# Patient Record
Sex: MALE | Race: WHITE | NOT HISPANIC OR LATINO | Employment: OTHER | URBAN - METROPOLITAN AREA
[De-identification: names, ages, dates, MRNs, and addresses within clinical notes are randomized per-mention and may not be internally consistent; named-entity substitution may affect disease eponyms.]

---

## 2023-02-15 PROBLEM — E78.5 HYPERLIPIDEMIA: Status: ACTIVE | Noted: 2023-02-15

## 2023-02-15 PROBLEM — G62.9 NEUROPATHY: Status: ACTIVE | Noted: 2023-02-15

## 2023-02-15 PROBLEM — M54.16 LUMBAR RADICULITIS: Status: ACTIVE | Noted: 2023-02-15

## 2023-02-15 PROBLEM — R09.89 CHEST CONGESTION: Status: ACTIVE | Noted: 2023-02-15

## 2023-02-15 PROBLEM — M47.816 LUMBAR SPONDYLOSIS: Status: ACTIVE | Noted: 2023-02-15

## 2023-02-15 PROBLEM — N41.9 PROSTATITIS: Status: ACTIVE | Noted: 2023-02-15

## 2023-02-15 PROBLEM — H53.40 VISUAL FIELD DEFECT: Status: ACTIVE | Noted: 2023-02-15

## 2023-02-15 PROBLEM — G56.01 CARPAL TUNNEL SYNDROME OF RIGHT WRIST: Status: ACTIVE | Noted: 2023-02-15

## 2023-02-15 PROBLEM — R35.81 NOCTURNAL POLYURIA: Status: ACTIVE | Noted: 2023-02-15

## 2023-02-15 PROBLEM — E11.9 DIABETES MELLITUS, TYPE 2 (MULTI): Status: ACTIVE | Noted: 2023-02-15

## 2023-02-15 PROBLEM — M77.8 TENDINITIS OF LEFT ELBOW: Status: ACTIVE | Noted: 2023-02-15

## 2023-02-15 PROBLEM — R20.2: Status: ACTIVE | Noted: 2023-02-15

## 2023-02-15 PROBLEM — H53.9 VISUAL DISTURBANCE: Status: ACTIVE | Noted: 2023-02-15

## 2023-02-15 PROBLEM — M54.50 CHRONIC LOW BACK PAIN: Status: ACTIVE | Noted: 2023-02-15

## 2023-02-15 PROBLEM — H54.61 VISION LOSS OF RIGHT EYE: Status: ACTIVE | Noted: 2023-02-15

## 2023-02-15 PROBLEM — R41.3 MEMORY IMPAIRMENT: Status: ACTIVE | Noted: 2023-02-15

## 2023-02-15 PROBLEM — E78.5 DYSLIPIDEMIA: Status: ACTIVE | Noted: 2023-02-15
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
## 2023-02-15 PROBLEM — I10 HYPERTENSION: Status: ACTIVE | Noted: 2023-02-15

## 2023-02-15 PROBLEM — M77.8 THUMB TENDONITIS: Status: ACTIVE | Noted: 2023-02-15

## 2023-02-15 PROBLEM — H57.11 OCULAR PAIN, RIGHT EYE: Status: ACTIVE | Noted: 2023-02-15

## 2023-02-15 PROBLEM — S39.012A STRAIN, LUMBOSACRAL: Status: ACTIVE | Noted: 2023-02-15

## 2023-02-15 PROBLEM — S39.011A STRAIN OF ABDOMINAL WALL: Status: ACTIVE | Noted: 2023-02-15

## 2023-02-15 PROBLEM — M48.061 SPINAL STENOSIS OF LUMBAR REGION: Status: ACTIVE | Noted: 2023-02-15

## 2023-02-15 PROBLEM — N40.0 BENIGN ENLARGEMENT OF PROSTATE: Status: ACTIVE | Noted: 2023-02-15

## 2023-02-15 PROBLEM — H25.099 SENILE INCIPIENT CATARACT: Status: ACTIVE | Noted: 2023-02-15

## 2023-02-15 PROBLEM — H52.7 REFRACTIVE ERROR: Status: ACTIVE | Noted: 2023-02-15

## 2023-02-15 PROBLEM — E11.9 TYPE 2 DIABETES MELLITUS (MULTI): Status: ACTIVE | Noted: 2023-02-15

## 2023-02-15 PROBLEM — S29.019A ACUTE THORACIC MYOFASCIAL STRAIN: Status: ACTIVE | Noted: 2023-02-15

## 2023-02-15 PROBLEM — K21.9 GASTROESOPHAGEAL REFLUX DISEASE: Status: ACTIVE | Noted: 2023-02-15

## 2023-02-15 PROBLEM — M96.1 LUMBAR POSTLAMINECTOMY SYNDROME: Status: ACTIVE | Noted: 2023-02-15

## 2023-02-15 PROBLEM — Z98.890 STATUS POST LUMBAR SPINE SURGERY FOR DECOMPRESSION OF SPINAL CORD: Status: ACTIVE | Noted: 2023-02-15

## 2023-02-15 PROBLEM — R53.81 MALAISE AND FATIGUE: Status: ACTIVE | Noted: 2023-02-15

## 2023-02-15 PROBLEM — R51.9 HEADACHE: Status: ACTIVE | Noted: 2023-02-15

## 2023-02-15 PROBLEM — H01.005 BLEPHARITIS OF LEFT LOWER EYELID: Status: ACTIVE | Noted: 2023-02-15

## 2023-02-15 PROBLEM — R53.83 MALAISE AND FATIGUE: Status: ACTIVE | Noted: 2023-02-15

## 2023-02-15 PROBLEM — G89.29 CHRONIC LOW BACK PAIN: Status: ACTIVE | Noted: 2023-02-15

## 2023-02-15 PROBLEM — G47.33 OBSTRUCTIVE SLEEP APNEA: Status: ACTIVE | Noted: 2023-02-15

## 2023-02-15 PROBLEM — I10 ACCELERATED HYPERTENSION: Status: ACTIVE | Noted: 2023-02-15

## 2023-02-15 PROBLEM — M51.26 LUMBAR DISC HERNIATION: Status: ACTIVE | Noted: 2023-02-15

## 2023-02-15 PROBLEM — E66.9 OBESITY: Status: ACTIVE | Noted: 2023-02-15

## 2023-02-15 PROBLEM — R05.3 COUGH, PERSISTENT: Status: ACTIVE | Noted: 2023-02-15

## 2023-02-15 PROBLEM — F41.8 ANXIETY ASSOCIATED WITH DEPRESSION: Status: ACTIVE | Noted: 2023-02-15

## 2023-02-15 PROBLEM — S13.4XXA WHIPLASH INJURY TO NECK: Status: ACTIVE | Noted: 2023-02-15

## 2023-02-15 PROBLEM — N52.9 ERECTILE DYSFUNCTION: Status: ACTIVE | Noted: 2023-02-15

## 2023-02-15 PROBLEM — M54.17 LUMBOSACRAL NEURITIS: Status: ACTIVE | Noted: 2023-02-15

## 2023-02-15 PROBLEM — H47.20 OPTIC ATROPHY OF RIGHT EYE: Status: ACTIVE | Noted: 2023-02-15

## 2023-02-15 PROBLEM — G56.02 CARPAL TUNNEL SYNDROME OF LEFT WRIST: Status: ACTIVE | Noted: 2023-02-15

## 2023-02-15 PROBLEM — R19.7 DIARRHEA: Status: ACTIVE | Noted: 2023-02-15

## 2023-02-15 PROBLEM — M79.18 MYOFASCIAL MUSCLE PAIN: Status: ACTIVE | Noted: 2023-02-15

## 2023-02-15 PROBLEM — H01.009 BLEPHARITIS: Status: ACTIVE | Noted: 2023-02-15

## 2023-02-15 PROBLEM — E55.9 VITAMIN D DEFICIENCY: Status: ACTIVE | Noted: 2023-02-15

## 2023-02-15 PROBLEM — M65.321 TRIGGER INDEX FINGER OF RIGHT HAND: Status: ACTIVE | Noted: 2023-02-15

## 2023-02-15 PROBLEM — H47.011 NAION (NON-ARTERITIC ANTERIOR ISCHEMIC OPTIC NEUROPATHY), RIGHT EYE: Status: ACTIVE | Noted: 2023-02-15

## 2023-02-15 PROBLEM — R09.89 RUNNY NOSE: Status: ACTIVE | Noted: 2023-02-15

## 2023-02-15 PROBLEM — L91.8 CUTANEOUS SKIN TAGS: Status: ACTIVE | Noted: 2023-02-15

## 2023-02-15 PROBLEM — N48.6 PEYRONIE DISEASE: Status: ACTIVE | Noted: 2023-02-15

## 2023-02-15 PROBLEM — L23.9 ALLERGIC DERMATITIS: Status: ACTIVE | Noted: 2023-02-15

## 2023-02-15 PROBLEM — M62.838 MUSCLE SPASMS OF NECK: Status: ACTIVE | Noted: 2023-02-15

## 2023-02-15 PROBLEM — F41.0 PANIC ATTACKS: Status: ACTIVE | Noted: 2023-02-15

## 2023-02-15 RX ORDER — ASPIRIN 81 MG/1
TABLET ORAL
COMMUNITY
Start: 2015-10-06

## 2023-02-15 RX ORDER — AMLODIPINE AND BENAZEPRIL HYDROCHLORIDE 5; 40 MG/1; MG/1
1 CAPSULE ORAL DAILY
COMMUNITY
Start: 2015-10-22 | End: 2023-03-22

## 2023-02-15 RX ORDER — ACETAMINOPHEN 500 MG
TABLET ORAL
COMMUNITY

## 2023-02-15 RX ORDER — FLUOXETINE HYDROCHLORIDE 40 MG/1
2 CAPSULE ORAL DAILY
COMMUNITY
Start: 2013-10-03 | End: 2023-03-22

## 2023-02-15 RX ORDER — METOPROLOL SUCCINATE 50 MG/1
1 TABLET, EXTENDED RELEASE ORAL DAILY
COMMUNITY
Start: 2019-03-18 | End: 2023-06-26 | Stop reason: SDUPTHER

## 2023-02-15 RX ORDER — SIMVASTATIN 10 MG/1
1 TABLET, FILM COATED ORAL DAILY
COMMUNITY
Start: 2019-09-25 | End: 2023-03-22

## 2023-02-15 RX ORDER — MULTIVITAMIN
TABLET ORAL
COMMUNITY

## 2023-02-15 RX ORDER — PANTOPRAZOLE SODIUM 40 MG/1
1 TABLET, DELAYED RELEASE ORAL DAILY
COMMUNITY
Start: 2019-05-16 | End: 2023-08-10

## 2023-02-15 RX ORDER — TAMSULOSIN HYDROCHLORIDE 0.4 MG/1
1 CAPSULE ORAL DAILY
COMMUNITY
Start: 2022-09-19

## 2023-02-15 RX ORDER — GABAPENTIN 300 MG/1
1 CAPSULE ORAL 2 TIMES DAILY
COMMUNITY
Start: 2020-03-23 | End: 2023-06-21

## 2023-02-15 RX ORDER — GLIMEPIRIDE 4 MG/1
1 TABLET ORAL 2 TIMES DAILY
COMMUNITY
End: 2023-03-22

## 2023-03-22 DIAGNOSIS — E13.9 DIABETES 1.5, MANAGED AS TYPE 2 (MULTI): ICD-10-CM

## 2023-03-22 DIAGNOSIS — I10 PRIMARY HYPERTENSION: ICD-10-CM

## 2023-03-22 DIAGNOSIS — F32.1 CURRENT MODERATE EPISODE OF MAJOR DEPRESSIVE DISORDER WITHOUT PRIOR EPISODE (MULTI): Primary | ICD-10-CM

## 2023-03-22 DIAGNOSIS — E78.5 DYSLIPIDEMIA: ICD-10-CM

## 2023-03-22 RX ORDER — FLUOXETINE HYDROCHLORIDE 40 MG/1
CAPSULE ORAL
Qty: 180 CAPSULE | Refills: 3 | Status: SHIPPED | OUTPATIENT
Start: 2023-03-22 | End: 2024-02-25

## 2023-03-22 RX ORDER — GLIMEPIRIDE 4 MG/1
TABLET ORAL
Qty: 180 TABLET | Refills: 3 | Status: SHIPPED | OUTPATIENT
Start: 2023-03-22 | End: 2023-12-26

## 2023-03-22 RX ORDER — AMLODIPINE AND BENAZEPRIL HYDROCHLORIDE 5; 40 MG/1; MG/1
CAPSULE ORAL
Qty: 90 CAPSULE | Refills: 3 | Status: SHIPPED | OUTPATIENT
Start: 2023-03-22 | End: 2023-06-15 | Stop reason: SDUPTHER

## 2023-03-22 RX ORDER — SIMVASTATIN 10 MG/1
TABLET, FILM COATED ORAL
Qty: 90 TABLET | Refills: 3 | Status: SHIPPED | OUTPATIENT
Start: 2023-03-22 | End: 2023-12-26

## 2023-03-28 ENCOUNTER — OFFICE VISIT (OUTPATIENT)
Dept: PRIMARY CARE | Facility: CLINIC | Age: 68
End: 2023-03-28
Payer: MEDICARE

## 2023-03-28 VITALS
SYSTOLIC BLOOD PRESSURE: 142 MMHG | TEMPERATURE: 98 F | OXYGEN SATURATION: 94 % | DIASTOLIC BLOOD PRESSURE: 71 MMHG | HEART RATE: 68 BPM | WEIGHT: 266 LBS | RESPIRATION RATE: 16 BRPM | HEIGHT: 74 IN | BODY MASS INDEX: 34.14 KG/M2

## 2023-03-28 DIAGNOSIS — E66.01 CLASS 3 SEVERE OBESITY DUE TO EXCESS CALORIES WITHOUT SERIOUS COMORBIDITY IN ADULT, UNSPECIFIED BMI (MULTI): ICD-10-CM

## 2023-03-28 DIAGNOSIS — M54.17 LUMBOSACRAL NEURITIS: ICD-10-CM

## 2023-03-28 DIAGNOSIS — Z79.4 TYPE 2 DIABETES MELLITUS WITHOUT COMPLICATION, WITH LONG-TERM CURRENT USE OF INSULIN (MULTI): Primary | ICD-10-CM

## 2023-03-28 DIAGNOSIS — G47.33 OBSTRUCTIVE SLEEP APNEA: ICD-10-CM

## 2023-03-28 DIAGNOSIS — I10 PRIMARY HYPERTENSION: ICD-10-CM

## 2023-03-28 DIAGNOSIS — E11.9 TYPE 2 DIABETES MELLITUS WITHOUT COMPLICATION, WITH LONG-TERM CURRENT USE OF INSULIN (MULTI): Primary | ICD-10-CM

## 2023-03-28 DIAGNOSIS — H47.011 NAION (NON-ARTERITIC ANTERIOR ISCHEMIC OPTIC NEUROPATHY), RIGHT EYE: ICD-10-CM

## 2023-03-28 DIAGNOSIS — E11.00 TYPE 2 DIABETES MELLITUS WITH HYPEROSMOLARITY WITHOUT COMA, UNSPECIFIED WHETHER LONG TERM INSULIN USE (MULTI): ICD-10-CM

## 2023-03-28 DIAGNOSIS — G62.9 NEUROPATHY: ICD-10-CM

## 2023-03-28 DIAGNOSIS — E78.2 MIXED HYPERLIPIDEMIA: ICD-10-CM

## 2023-03-28 DIAGNOSIS — M47.816 LUMBAR SPONDYLOSIS: ICD-10-CM

## 2023-03-28 LAB
HBA1C MFR BLD: 5.8 % (ref 4.2–6.5)
POC FINGERSTICK BLOOD GLUCOSE: 137 MG/DL (ref 70–100)

## 2023-03-28 PROCEDURE — 99214 OFFICE O/P EST MOD 30 MIN: CPT | Performed by: FAMILY MEDICINE

## 2023-03-28 PROCEDURE — 3077F SYST BP >= 140 MM HG: CPT | Performed by: FAMILY MEDICINE

## 2023-03-28 PROCEDURE — 83036 HEMOGLOBIN GLYCOSYLATED A1C: CPT | Performed by: FAMILY MEDICINE

## 2023-03-28 PROCEDURE — 82962 GLUCOSE BLOOD TEST: CPT | Performed by: FAMILY MEDICINE

## 2023-03-28 PROCEDURE — 1159F MED LIST DOCD IN RCRD: CPT | Performed by: FAMILY MEDICINE

## 2023-03-28 PROCEDURE — 1036F TOBACCO NON-USER: CPT | Performed by: FAMILY MEDICINE

## 2023-03-28 PROCEDURE — 3044F HG A1C LEVEL LT 7.0%: CPT | Performed by: FAMILY MEDICINE

## 2023-03-28 PROCEDURE — 1160F RVW MEDS BY RX/DR IN RCRD: CPT | Performed by: FAMILY MEDICINE

## 2023-03-28 PROCEDURE — 3078F DIAST BP <80 MM HG: CPT | Performed by: FAMILY MEDICINE

## 2023-03-28 ASSESSMENT — PAIN SCALES - GENERAL: PAINLEVEL: 0-NO PAIN

## 2023-03-28 NOTE — PROGRESS NOTES
Subjective   Rehan Holman is a 67 y.o. male who presents for Follow-up (Glucose and A1C completed today).  HPI   ; patient is a 60-year-old diabetic male who presents today for recheck on his blood sugar and A1c.  Patient states he has been watching his diet and has lost some weight.  He hopes that his A1c is down since it was quite elevated last visit.  Patient also takes medication for hypertension and an antidepressant for chronic depression, he also recently had some lumbar back surgery and has recovered quite well.      Objective ROS  ;10 systems were reviewed and the information is included in the HPI and no additional review of systems is indicated.    Physical Exam  Vitals and nursing note reviewed.   Constitutional:       Appearance: Normal appearance. He is obese.      Comments: Patient is alert and oriented.   HENT:      Head: Normocephalic.      Right Ear: Tympanic membrane and external ear normal.      Left Ear: Tympanic membrane and external ear normal.      Nose: Nose normal.      Mouth/Throat:      Mouth: Mucous membranes are moist.      Pharynx: Oropharynx is clear.   Eyes:      Extraocular Movements: Extraocular movements intact.      Conjunctiva/sclera: Conjunctivae normal.      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  He also has a cataract in the left eye but is afraid to have it operated on.   Neck:      Comments: Occasional neck spasm and restriction of motion secondary to stress and tension.  Cardiovascular:      Rate and Rhythm: Normal rate and regular rhythm.      Pulses: Normal pulses.      Heart sounds: Normal heart sounds.      Comments: Heart rhythm is stable S1 and S2 are noted, no ectopics.  Pulmonary:      Effort: Pulmonary effort is normal.      Comments: Lungs are clear to auscultation.    Abdominal:      General: Abdomen is flat. Bowel sounds are normal.      Palpations: Abdomen is soft.      Comments: Abdomen is soft and obese, and nontender, no  hepatosplenomegaly.  Patient is trying to diet   and lose weight.    No guarding and no rebound tenderness.  No flank pain.   Genitourinary:     Comments: Not examined  Musculoskeletal:         General: Normal range of motion.      Cervical back: Normal range of motion.      Comments: Patient has had 2 lumbar back surgeries and the last one was several months ago and he is recovering well.  He states his back pain is less and his ambulation is better.     Skin:     General: Skin is warm and dry.   Neurological:      General: No focal deficit present.      Mental Status: He is alert and oriented to person, place, and time. Mental status is at baseline.      Deep Tendon Reflexes: Reflexes abnormal.      Comments: Patient has had neuropathy in the lower extremities and some of that may be diabetic and some of it is from lumbosacral neuritis.     Psychiatric:         Mood and Affect: Mood normal.         Behavior: Behavior normal.         Thought Content: Thought content normal.         Judgment: Judgment normal.      Comments: Patient has normal mood and affect.  He does suffer with depression but Prozac has helped considerably.     PLAN  ; patient is a 67-year-old male who is evaluated mainly for his diabetes today.  His blood sugar was 137 and his A1c had improved to 5.8%.  He has been watching his diet closely since his last A1c was quite elevated 3 months ago.  He is trying to avoid the cookies and sweets however he still does not exercise.  Patient otherwise is stable with blood pressure and he will follow-up in 3 to 4 months and hopefully his A1c will stay under 7.0%.    Problem List Items Addressed This Visit          Nervous    Obstructive sleep apnea    Neuropathy    NAION (non-arteritic anterior ischemic optic neuropathy), right eye    Lumbosacral neuritis       Circulatory    Hypertension       Musculoskeletal    Lumbar spondylosis       Endocrine/Metabolic    Type 2 diabetes mellitus (CMS/HCC)    Relevant  Orders    POCT fingerstick glucose manually resulted (Completed)    POCT Glycosylated Hemoglobin (HGB A1C) docked device (Completed)    Obesity    Diabetes mellitus, type 2 (CMS/HCC) - Primary    Relevant Orders    POCT fingerstick glucose manually resulted (Completed)    POCT Glycosylated Hemoglobin (HGB A1C) docked device (Completed)       Other    Hyperlipidemia            Yadiel Malone, DO

## 2023-06-15 DIAGNOSIS — I10 PRIMARY HYPERTENSION: ICD-10-CM

## 2023-06-16 RX ORDER — AMLODIPINE AND BENAZEPRIL HYDROCHLORIDE 5; 40 MG/1; MG/1
1 CAPSULE ORAL DAILY
Qty: 90 CAPSULE | Refills: 3 | Status: SHIPPED | OUTPATIENT
Start: 2023-06-16 | End: 2024-05-13

## 2023-06-20 DIAGNOSIS — M54.17 LUMBOSACRAL RADICULITIS: Primary | ICD-10-CM

## 2023-06-21 RX ORDER — GABAPENTIN 300 MG/1
CAPSULE ORAL
Qty: 200 CAPSULE | Refills: 2 | Status: SHIPPED | OUTPATIENT
Start: 2023-06-21 | End: 2024-04-01

## 2023-06-26 DIAGNOSIS — I10 PRIMARY HYPERTENSION: ICD-10-CM

## 2023-06-26 RX ORDER — METOPROLOL SUCCINATE 50 MG/1
50 TABLET, EXTENDED RELEASE ORAL DAILY
Qty: 90 TABLET | Refills: 3 | Status: SHIPPED | OUTPATIENT
Start: 2023-06-26 | End: 2024-05-13

## 2023-07-09 ASSESSMENT — ENCOUNTER SYMPTOMS
SWEATS: 1
BLACKOUTS: 0
DIZZINESS: 1
WEAKNESS: 1

## 2023-07-11 ENCOUNTER — OFFICE VISIT (OUTPATIENT)
Dept: PRIMARY CARE | Facility: CLINIC | Age: 68
End: 2023-07-11
Payer: MEDICARE

## 2023-07-11 VITALS
BODY MASS INDEX: 32.85 KG/M2 | OXYGEN SATURATION: 92 % | HEIGHT: 74 IN | HEART RATE: 56 BPM | DIASTOLIC BLOOD PRESSURE: 77 MMHG | RESPIRATION RATE: 18 BRPM | TEMPERATURE: 97.9 F | SYSTOLIC BLOOD PRESSURE: 132 MMHG | WEIGHT: 256 LBS

## 2023-07-11 DIAGNOSIS — M79.672 PAIN OF LEFT HEEL: Primary | ICD-10-CM

## 2023-07-11 DIAGNOSIS — M79.18 MYOFASCIAL MUSCLE PAIN: ICD-10-CM

## 2023-07-11 DIAGNOSIS — E78.5 DYSLIPIDEMIA: ICD-10-CM

## 2023-07-11 DIAGNOSIS — M96.1 LUMBAR POSTLAMINECTOMY SYNDROME: ICD-10-CM

## 2023-07-11 DIAGNOSIS — H54.61 VISION LOSS OF RIGHT EYE: ICD-10-CM

## 2023-07-11 DIAGNOSIS — I10 PRIMARY HYPERTENSION: ICD-10-CM

## 2023-07-11 DIAGNOSIS — F41.8 ANXIETY ASSOCIATED WITH DEPRESSION: ICD-10-CM

## 2023-07-11 DIAGNOSIS — Z12.5 SCREENING PSA (PROSTATE SPECIFIC ANTIGEN): ICD-10-CM

## 2023-07-11 DIAGNOSIS — M76.62 ACHILLES TENDINITIS OF LEFT LOWER EXTREMITY: ICD-10-CM

## 2023-07-11 DIAGNOSIS — I10 ACCELERATED HYPERTENSION: ICD-10-CM

## 2023-07-11 DIAGNOSIS — G47.33 OBSTRUCTIVE SLEEP APNEA: ICD-10-CM

## 2023-07-11 DIAGNOSIS — E11.9 DIABETES MELLITUS WITHOUT COMPLICATION (MULTI): ICD-10-CM

## 2023-07-11 DIAGNOSIS — E55.9 VITAMIN D DEFICIENCY: ICD-10-CM

## 2023-07-11 LAB
ALANINE AMINOTRANSFERASE (SGPT) (U/L) IN SER/PLAS: 27 U/L (ref 10–52)
ALBUMIN (G/DL) IN SER/PLAS: 4.3 G/DL (ref 3.4–5)
ALKALINE PHOSPHATASE (U/L) IN SER/PLAS: 59 U/L (ref 33–136)
ANION GAP IN SER/PLAS: 14 MMOL/L (ref 10–20)
ASPARTATE AMINOTRANSFERASE (SGOT) (U/L) IN SER/PLAS: 23 U/L (ref 9–39)
BILIRUBIN TOTAL (MG/DL) IN SER/PLAS: 0.9 MG/DL (ref 0–1.2)
CALCIUM (MG/DL) IN SER/PLAS: 9.4 MG/DL (ref 8.6–10.6)
CARBON DIOXIDE, TOTAL (MMOL/L) IN SER/PLAS: 26 MMOL/L (ref 21–32)
CHLORIDE (MMOL/L) IN SER/PLAS: 102 MMOL/L (ref 98–107)
CHOLESTEROL (MG/DL) IN SER/PLAS: 179 MG/DL (ref 0–199)
CHOLESTEROL IN HDL (MG/DL) IN SER/PLAS: 54.4 MG/DL
CHOLESTEROL/HDL RATIO: 3.3
CREATININE (MG/DL) IN SER/PLAS: 0.77 MG/DL (ref 0.5–1.3)
ERYTHROCYTE DISTRIBUTION WIDTH (RATIO) BY AUTOMATED COUNT: 13.3 % (ref 11.5–14.5)
ERYTHROCYTE MEAN CORPUSCULAR HEMOGLOBIN CONCENTRATION (G/DL) BY AUTOMATED: 31.9 G/DL (ref 32–36)
ERYTHROCYTE MEAN CORPUSCULAR VOLUME (FL) BY AUTOMATED COUNT: 94 FL (ref 80–100)
ERYTHROCYTES (10*6/UL) IN BLOOD BY AUTOMATED COUNT: 4.66 X10E12/L (ref 4.5–5.9)
GFR MALE: >90 ML/MIN/1.73M2
GLUCOSE (MG/DL) IN SER/PLAS: 133 MG/DL (ref 74–99)
HBA1C MFR BLD: 5.8 % (ref 4.2–6.5)
HEMATOCRIT (%) IN BLOOD BY AUTOMATED COUNT: 43.9 % (ref 41–52)
HEMOGLOBIN (G/DL) IN BLOOD: 14 G/DL (ref 13.5–17.5)
LDL: 105 MG/DL (ref 0–99)
LEUKOCYTES (10*3/UL) IN BLOOD BY AUTOMATED COUNT: 6.7 X10E9/L (ref 4.4–11.3)
NRBC (PER 100 WBCS) BY AUTOMATED COUNT: 0 /100 WBC (ref 0–0)
PLATELETS (10*3/UL) IN BLOOD AUTOMATED COUNT: 258 X10E9/L (ref 150–450)
POC FINGERSTICK BLOOD GLUCOSE: 128 MG/DL (ref 70–100)
POTASSIUM (MMOL/L) IN SER/PLAS: 4.3 MMOL/L (ref 3.5–5.3)
PROSTATE SPECIFIC ANTIGEN,SCREEN: 0.43 NG/ML (ref 0–4)
PROTEIN TOTAL: 6.8 G/DL (ref 6.4–8.2)
SODIUM (MMOL/L) IN SER/PLAS: 138 MMOL/L (ref 136–145)
THYROTROPIN (MIU/L) IN SER/PLAS BY DETECTION LIMIT <= 0.05 MIU/L: 1.3 MIU/L (ref 0.44–3.98)
TRIGLYCERIDE (MG/DL) IN SER/PLAS: 98 MG/DL (ref 0–149)
UREA NITROGEN (MG/DL) IN SER/PLAS: 15 MG/DL (ref 6–23)
VLDL: 20 MG/DL (ref 0–40)

## 2023-07-11 PROCEDURE — 82962 GLUCOSE BLOOD TEST: CPT | Performed by: FAMILY MEDICINE

## 2023-07-11 PROCEDURE — 99214 OFFICE O/P EST MOD 30 MIN: CPT | Performed by: FAMILY MEDICINE

## 2023-07-11 PROCEDURE — 3078F DIAST BP <80 MM HG: CPT | Performed by: FAMILY MEDICINE

## 2023-07-11 PROCEDURE — 80053 COMPREHEN METABOLIC PANEL: CPT

## 2023-07-11 PROCEDURE — 1125F AMNT PAIN NOTED PAIN PRSNT: CPT | Performed by: FAMILY MEDICINE

## 2023-07-11 PROCEDURE — 1036F TOBACCO NON-USER: CPT | Performed by: FAMILY MEDICINE

## 2023-07-11 PROCEDURE — 1159F MED LIST DOCD IN RCRD: CPT | Performed by: FAMILY MEDICINE

## 2023-07-11 PROCEDURE — G0103 PSA SCREENING: HCPCS

## 2023-07-11 PROCEDURE — 3075F SYST BP GE 130 - 139MM HG: CPT | Performed by: FAMILY MEDICINE

## 2023-07-11 PROCEDURE — 80061 LIPID PANEL: CPT

## 2023-07-11 PROCEDURE — 84443 ASSAY THYROID STIM HORMONE: CPT

## 2023-07-11 PROCEDURE — 85027 COMPLETE CBC AUTOMATED: CPT

## 2023-07-11 PROCEDURE — 1160F RVW MEDS BY RX/DR IN RCRD: CPT | Performed by: FAMILY MEDICINE

## 2023-07-11 PROCEDURE — 3044F HG A1C LEVEL LT 7.0%: CPT | Performed by: FAMILY MEDICINE

## 2023-07-11 PROCEDURE — 83036 HEMOGLOBIN GLYCOSYLATED A1C: CPT | Performed by: FAMILY MEDICINE

## 2023-07-11 RX ORDER — MELOXICAM 7.5 MG/1
7.5 TABLET ORAL DAILY PRN
Qty: 30 TABLET | Refills: 2 | Status: SHIPPED | OUTPATIENT
Start: 2023-07-11 | End: 2023-10-09

## 2023-07-11 ASSESSMENT — ENCOUNTER SYMPTOMS
BLACKOUTS: 0
WEAKNESS: 1
DIZZINESS: 1
SWEATS: 1

## 2023-07-11 ASSESSMENT — PAIN SCALES - GENERAL: PAINLEVEL: 10-WORST PAIN EVER

## 2023-07-11 NOTE — PROGRESS NOTES
Subjective   Rehan Holman is a 68 y.o. male who presents for Follow-up (Patient diabetic).    Diabetes  He has type 2 diabetes mellitus. No MedicAlert identification noted. The initial diagnosis of diabetes was made 8 years ago. Hypoglycemia symptoms include dizziness and sweats. Associated symptoms include foot paresthesias and weakness. Pertinent negatives for hypoglycemia complications include no blackouts, no hospitalization, no nocturnal hypoglycemia and no required assistance. Symptoms are worsening. Diabetic complications include a CVA and retinopathy. There are no known risk factors for coronary artery disease. Current diabetic treatment includes oral agent (monotherapy). He is compliant with treatment most of the time. He is currently taking insulin pre-breakfast. Insulin injections are given by patient. He is following a generally healthy diet. Meal planning includes avoidance of concentrated sweets. He has not had a previous visit with a dietitian. He participates in exercise every other day. He monitors blood glucose at home 1-2 x per day. He monitors urine at home <1 x per month. Blood glucose monitoring compliance is good. His dinner blood glucose range is generally 130-140 mg/dl. His overall blood glucose range is 130-140 mg/dl. He sees a podiatrist.Eye exam is current.     : Patient is a 68-year-old diabetic male who is in for recheck on his blood sugar and A1c.  Patient also will have his lipids rechecked, blood counts and liver enzymes.  He is complaining of severe left heel pain that has been almost daily since 2 months ago.  He did see a foot doctor and was given an injection in the heel but it has not helped.  Patient was hoping I could order an x-ray to see if there is a spur or if it is just calcaneal tendinitis.  Patient also will have his blood work done today and we will see how his diabetes is controlled.  He has some emotional stress since his mother is in a nursing home facility and her  condition is worsening and she recently fractured her shoulder and apparently is just about bed ridden.  This emotional stress with the heel pain has him quite aggravated and anxious.      Objective  : ROS :10 systems were reviewed and the information is included in the HPI and no additional review of systems is indicated.    Physical Exam  Vitals and nursing note reviewed.   Constitutional:       Appearance: Normal appearance. He is obese.      Comments: Patient is alert and oriented x3.  He is dieting and has lost weight and is watching his intake cautiously.  He has lost over 20 pounds.   HENT:      Head: Normocephalic.      Right Ear: Tympanic membrane and external ear normal.      Left Ear: Tympanic membrane and external ear normal.      Nose: Nose normal.      Mouth/Throat:      Mouth: Mucous membranes are moist.      Pharynx: Oropharynx is clear.      Comments: Mouth is moist, tongue is midline, no posterior pharyngeal erythema.  Eyes:      Extraocular Movements: Extraocular movements intact.      Conjunctiva/sclera: Conjunctivae normal.      Pupils: Pupils are equal, round, and reactive to light.      Comments: Patient suffers with blindness in the right eye from NAION .  He does follow with ophthalmology and the retinal specialist.   Neck:      Comments: Occasional neck spasm and restriction of motion secondary to stress and tension.  No thyromegaly, no carotid bruits and no cervical lymphadenopathy.  Cardiovascular:      Rate and Rhythm: Normal rate and regular rhythm.      Pulses: Normal pulses.      Heart sounds: Normal heart sounds.      Comments: Heart rhythm is stable S1 and S2 are noted, no ectopics.  Patient denies chest pain or palpitations.  Pulmonary:      Effort: Pulmonary effort is normal.      Comments: Lungs are clear to auscultation.    Patient denies coughing or wheezing.  Abdominal:      General: Abdomen is flat. Bowel sounds are normal.      Palpations: Abdomen is soft.      Comments:  Abdomen is soft and mildly obese, nontender, no hepatosplenomegaly.   Genitourinary:     Comments: Patient denies any flank pain, denies any dysuria, no hematuria and occasional nocturia.  Musculoskeletal:         General: Tenderness present. Normal range of motion.      Cervical back: Normal range of motion and neck supple.      Comments: Mild restriction of motion cervical and lumbar spines due to muscle spasm.  Patient has had 2 lumbar back surgeries and does have some chronic low back pain.  He does have age-related arthritis in his hips and knees.  He is also having left heel pain which she will have x-rayed to rule out calcaneal tendinitis, versus heel spur syndrome.  No ankle edema.   Skin:     General: Skin is warm.      Comments: He denies any skin lesions, no rashes, no bruising.   Neurological:      Mental Status: He is alert and oriented to person, place, and time. Mental status is at baseline.      Comments: Patient has a history of sciatica and lumbosacral disc disease.  Currently he is stable since his last back operation.  Does have some neuropathy in his lower feet from diabetes and radiculopathy.  Patient is concerned that the heel pain that he is experiencing is more of a neuropathy than a spur.   Psychiatric:         Mood and Affect: Mood normal.         Behavior: Behavior normal.         Thought Content: Thought content normal.         Judgment: Judgment normal.      Comments: Denies depression but has some anxiety and worry about his mom who is in poor condition and he realizes that her condition is terminal.  Patient's thought content and judgment are normal.  Behavior is normal.  Decision making is normal.     PLAN : Patient is a 68-year-old diabetic male who is evaluated today for several problems and concerns.  His blood sugar was stable at 128 and his A1c was very good at 5.8%.  He has been on a diet and lost about 20 pounds.  He is trying to watch what he eats and maintain good eating  habits.  He has been having left heel pain since the end of May and he will be sent for an x-ray evaluation to see if there is a spur on the heel or if it is calcaneal tendinitis versus neuropathy.  He will be notified of his other blood results in 4 days and further evaluation will be made at that time.  He also was prescribed meloxicam 7.5 mg daily to help with some of the heel pain.  He also was instructed on stretching exercises and he will follow-up after the x-ray report.  Patient is otherwise stable until next appointment.    Problem List Items Addressed This Visit       Accelerated hypertension    Relevant Orders    CBC    Comprehensive Metabolic Panel    Lipid Panel    POCT fingerstick glucose manually resulted (Completed)    Thyroid Stimulating Hormone    POCT Glycosylated Hemoglobin (HGB A1C) docked device    Anxiety associated with depression    Relevant Orders    CBC    Comprehensive Metabolic Panel    Lipid Panel    POCT fingerstick glucose manually resulted (Completed)    Thyroid Stimulating Hormone    POCT Glycosylated Hemoglobin (HGB A1C) docked device    Vitamin D deficiency    Vision loss of right eye    Obstructive sleep apnea    Myofascial muscle pain    Lumbar postlaminectomy syndrome    Hypertension    Relevant Orders    CBC    Comprehensive Metabolic Panel    Lipid Panel    POCT fingerstick glucose manually resulted (Completed)    Thyroid Stimulating Hormone    POCT Glycosylated Hemoglobin (HGB A1C) docked device    Dyslipidemia    Relevant Orders    CBC    Comprehensive Metabolic Panel    Lipid Panel    POCT fingerstick glucose manually resulted (Completed)    Thyroid Stimulating Hormone    POCT Glycosylated Hemoglobin (HGB A1C) docked device    Diabetes mellitus without complication (CMS/HCC)    Relevant Orders    POCT fingerstick glucose manually resulted (Completed)    Thyroid Stimulating Hormone    POCT Glycosylated Hemoglobin (HGB A1C) docked device    Screening PSA (prostate specific  antigen)    Relevant Orders    Prostate Specific Antigen, Screen    Pain of left heel - Primary    Relevant Orders    XR calcaneus left 2 views    Achilles tendinitis of left lower extremity    Relevant Medications    meloxicam (Mobic) 7.5 mg tablet            Yadiel Malone DO

## 2023-07-14 NOTE — RESULT ENCOUNTER NOTE
Cholesterol was normal at 179   triglycerides are normal at 98    much better than last time   kidney and liver function are stable     Red and white blood cell counts are normal thyroid function is normal       prostate level is normal       blood work looks very good and the diabetes is stable       keep up the good work   and watch the diet    I am still waiting for the x-ray on the heel     it has not come through the computer yet

## 2023-07-18 ENCOUNTER — TELEPHONE (OUTPATIENT)
Dept: PRIMARY CARE | Facility: CLINIC | Age: 68
End: 2023-07-18
Payer: MEDICARE

## 2023-07-18 NOTE — TELEPHONE ENCOUNTER
Patient calling requesting results of his recent x-ray at [facility]. I printed this results from community record and put this on your desk to review.

## 2023-07-25 DIAGNOSIS — M72.2 PLANTAR FASCIITIS: ICD-10-CM

## 2023-07-25 DIAGNOSIS — M77.8 TENDINITIS OF BOTH FEET: ICD-10-CM

## 2023-08-10 DIAGNOSIS — K21.00 GASTROESOPHAGEAL REFLUX DISEASE WITH ESOPHAGITIS WITHOUT HEMORRHAGE: Primary | ICD-10-CM

## 2023-08-10 RX ORDER — PANTOPRAZOLE SODIUM 40 MG/1
40 TABLET, DELAYED RELEASE ORAL DAILY
Qty: 100 TABLET | Refills: 2 | Status: SHIPPED | OUTPATIENT
Start: 2023-08-10 | End: 2024-05-13

## 2023-10-08 ASSESSMENT — ENCOUNTER SYMPTOMS
TREMORS: 0
BLACKOUTS: 0
FATIGUE: 1
POLYPHAGIA: 0
HEADACHES: 0
VISUAL CHANGE: 0
DIZZINESS: 0
SEIZURES: 0
BLURRED VISION: 0
NERVOUS/ANXIOUS: 0
CONFUSION: 0
HUNGER: 0
SWEATS: 1
POLYDIPSIA: 0
WEAKNESS: 0
SPEECH DIFFICULTY: 0
WEIGHT LOSS: 1

## 2023-10-11 ENCOUNTER — OFFICE VISIT (OUTPATIENT)
Dept: PRIMARY CARE | Facility: CLINIC | Age: 68
End: 2023-10-11
Payer: MEDICARE

## 2023-10-11 VITALS
OXYGEN SATURATION: 96 % | WEIGHT: 257 LBS | HEIGHT: 74 IN | TEMPERATURE: 97.9 F | SYSTOLIC BLOOD PRESSURE: 130 MMHG | RESPIRATION RATE: 18 BRPM | BODY MASS INDEX: 32.98 KG/M2 | DIASTOLIC BLOOD PRESSURE: 80 MMHG | HEART RATE: 53 BPM

## 2023-10-11 DIAGNOSIS — K21.00 GASTROESOPHAGEAL REFLUX DISEASE WITH ESOPHAGITIS WITHOUT HEMORRHAGE: ICD-10-CM

## 2023-10-11 DIAGNOSIS — I10 PRIMARY HYPERTENSION: Primary | ICD-10-CM

## 2023-10-11 DIAGNOSIS — Z23 NEEDS FLU SHOT: ICD-10-CM

## 2023-10-11 DIAGNOSIS — E11.9 TYPE 2 DIABETES MELLITUS WITHOUT COMPLICATION, WITHOUT LONG-TERM CURRENT USE OF INSULIN (MULTI): ICD-10-CM

## 2023-10-11 DIAGNOSIS — E11.9 DIABETES MELLITUS WITHOUT COMPLICATION (MULTI): ICD-10-CM

## 2023-10-11 DIAGNOSIS — F41.8 ANXIETY ASSOCIATED WITH DEPRESSION: ICD-10-CM

## 2023-10-11 DIAGNOSIS — H54.61 VISION LOSS OF RIGHT EYE: ICD-10-CM

## 2023-10-11 DIAGNOSIS — G62.9 NEUROPATHY: ICD-10-CM

## 2023-10-11 DIAGNOSIS — E66.9 OBESITY (BMI 30.0-34.9): ICD-10-CM

## 2023-10-11 DIAGNOSIS — H47.011 NAION (NON-ARTERITIC ANTERIOR ISCHEMIC OPTIC NEUROPATHY), RIGHT EYE: ICD-10-CM

## 2023-10-11 PROBLEM — E66.811 OBESITY (BMI 30.0-34.9): Status: ACTIVE | Noted: 2023-10-11

## 2023-10-11 LAB
HBA1C MFR BLD: 6.1 % (ref 4.2–6.5)
POC FINGERSTICK BLOOD GLUCOSE: 198 MG/DL (ref 70–100)

## 2023-10-11 PROCEDURE — 83036 HEMOGLOBIN GLYCOSYLATED A1C: CPT | Mod: CLIA WAIVED TEST | Performed by: FAMILY MEDICINE

## 2023-10-11 PROCEDURE — 1036F TOBACCO NON-USER: CPT | Performed by: FAMILY MEDICINE

## 2023-10-11 PROCEDURE — 99214 OFFICE O/P EST MOD 30 MIN: CPT | Performed by: FAMILY MEDICINE

## 2023-10-11 PROCEDURE — 1159F MED LIST DOCD IN RCRD: CPT | Performed by: FAMILY MEDICINE

## 2023-10-11 PROCEDURE — 3079F DIAST BP 80-89 MM HG: CPT | Performed by: FAMILY MEDICINE

## 2023-10-11 PROCEDURE — 1125F AMNT PAIN NOTED PAIN PRSNT: CPT | Performed by: FAMILY MEDICINE

## 2023-10-11 PROCEDURE — 1160F RVW MEDS BY RX/DR IN RCRD: CPT | Performed by: FAMILY MEDICINE

## 2023-10-11 PROCEDURE — 3075F SYST BP GE 130 - 139MM HG: CPT | Performed by: FAMILY MEDICINE

## 2023-10-11 PROCEDURE — 82962 GLUCOSE BLOOD TEST: CPT | Performed by: FAMILY MEDICINE

## 2023-10-11 PROCEDURE — 3044F HG A1C LEVEL LT 7.0%: CPT | Performed by: FAMILY MEDICINE

## 2023-10-11 ASSESSMENT — PATIENT HEALTH QUESTIONNAIRE - PHQ9
1. LITTLE INTEREST OR PLEASURE IN DOING THINGS: SEVERAL DAYS
SUM OF ALL RESPONSES TO PHQ9 QUESTIONS 1 AND 2: 1
2. FEELING DOWN, DEPRESSED OR HOPELESS: NOT AT ALL

## 2023-10-11 ASSESSMENT — PAIN SCALES - GENERAL: PAINLEVEL: 7

## 2023-10-11 NOTE — PROGRESS NOTES
Subjective   Rehan Holman is a 68 y.o. male who presents for Follow-up (Follow up visit for diabetes).    HPI : Patient is being evaluated for Diabetes and Hypertension.   He has been watching his diet and trying to  lose weight.  Patient also recently completed his physical therapy for his plantars fasciitis and this has significantly improved.    Objective  :ROS : 10 systems were reviewed and the information is included in the HPI and no additional review of systems is indicated.      Physical Exam  Vitals and nursing note reviewed.   Constitutional:       Appearance: Normal appearance. He is obese.      Comments: Patient is alert and oriented x3.   No acute distress and patient continues to try to diet and lose weight.   HENT:      Head: Normocephalic.      Right Ear: Tympanic membrane and external ear normal.      Left Ear: Tympanic membrane and external ear normal.      Ears:      Comments: Ears are patent bilaterally and TMs are clear.     Nose: Nose normal.      Mouth/Throat:      Mouth: Mucous membranes are moist.      Pharynx: Oropharynx is clear.      Comments: Mouth is moist, tongue is midline.  No posterior pharyngeal erythema.  Eyes:      Extraocular Movements: Extraocular movements intact.      Conjunctiva/sclera: Conjunctivae normal.      Pupils: Pupils are equal, round, and reactive to light.      Comments: Patient is legally blind in the right eye due to   NAION.   Patient does follow with ophthalmology and retinal specialty.   Neck:      Comments: No carotid bruits, no thyromegaly, no cervical adenopathy.  Occasional neck spasm and restriction of motion secondary to  arthritis,  stress and tension.  Cardiovascular:      Rate and Rhythm: Normal rate and regular rhythm.      Pulses: Normal pulses.      Heart sounds: Normal heart sounds.      Comments: Patient denies chest pain and no palpitations.  Heart rhythm is stable S1 and S2 are noted, no ectopics.  Pulmonary:      Effort: Pulmonary effort is  normal.      Breath sounds: Normal breath sounds.      Comments: Patient denies any coughing or wheezing.  Lungs are clear to auscultation.    Abdominal:      General: Bowel sounds are normal.      Palpations: Abdomen is soft.      Comments: Abdomen is soft and  obese, non tender, no hepatosplenomegaly.  No flank tenderness.  No suprapubic pain.  Positive bowel sounds x4.     Genitourinary:     Comments: Patient denies dysuria, no hematuria,  denies flank pain.   Nocturia.   Musculoskeletal:         General: Tenderness present.      Cervical back: Normal range of motion.      Comments: Trigger finger  left 4 th digit.  Age-related arthritis in the joints.  Mild restriction of motion cervical and lumbar spines due to arthritis and muscle spasm.  Stable post lumbar spine surgery for stenosis.  Patient has had 2 lumbar back surgeries in the past.  Currently stable   Skin:     General: Skin is warm.      Comments: There is no bruising, no erythema, no skin lesions noted, no rashes.   Neurological:      General: No focal deficit present.      Mental Status: He is alert and oriented to person, place, and time. Mental status is at baseline.      Sensory: Sensory deficit present.      Comments: No focal neurosensory deficits are noted.  Patient denies any peripheral neuropathy.  Coordination and gait are stable.  Normal muscle strength upper and lower extremities.   Psychiatric:         Behavior: Behavior normal.         Thought Content: Thought content normal.         Judgment: Judgment normal.      Comments:  patient does have some anxiety and stress since his mom is in a nursing home facility and she is 97 years of age and not doing well.  .Thought content and judgment are stable.  No signs of vascular dementia.  Behavior is normal.     PLAN : Patient is a 68-year-old male who is evaluated today primarily for his diabetes and hypertension.  He has been dieting and watching what he eats and has lost some weight.  Today's  blood sugar was 198 but he did eat before he came in and his A1c was stable at 6.1%.  He will continue with his diet program but he does need to exercise a little more now that his plantar fasciitis has improved.  He will return in 3 or 4 months for recheck and he did receive his flu shot today.    Problem List Items Addressed This Visit       Anxiety associated with depression    Type 2 diabetes mellitus (CMS/[facility])    Vision loss of right eye    Neuropathy    NAION (non-arteritic anterior ischemic optic neuropathy), right eye    Hypertension - Primary    Gastroesophageal reflux disease    Diabetes mellitus without complication (CMS/[facility])    Relevant Orders    POCT fingerstick glucose manually resulted    POCT Glycosylated Hemoglobin (HGB A1C) docked device    Obesity (BMI 30.0-34.9)            Yadiel Maloen, DO

## 2023-10-12 PROCEDURE — G0008 ADMIN INFLUENZA VIRUS VAC: HCPCS | Performed by: FAMILY MEDICINE

## 2023-10-12 PROCEDURE — 90662 IIV NO PRSV INCREASED AG IM: CPT | Performed by: FAMILY MEDICINE

## 2023-11-17 ENCOUNTER — TELEPHONE (OUTPATIENT)
Dept: PRIMARY CARE | Facility: CLINIC | Age: 68
End: 2023-11-17
Payer: MEDICARE

## 2023-11-17 NOTE — TELEPHONE ENCOUNTER
Pt called because he had a fall, he is currently at [facility] and wanting to get an xray but they can't do it without an order. The xray will be for his right foot and ankle.
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
## 2023-11-21 ENCOUNTER — TELEPHONE (OUTPATIENT)
Dept: PRIMARY CARE | Facility: CLINIC | Age: 68
End: 2023-11-21
Payer: MEDICARE

## 2023-11-21 DIAGNOSIS — M54.16 LUMBAR RADICULITIS: Primary | ICD-10-CM

## 2023-11-21 DIAGNOSIS — M19.90 ARTHRITIS: ICD-10-CM

## 2023-11-21 DIAGNOSIS — G62.9 NEUROPATHY: ICD-10-CM

## 2023-11-21 DIAGNOSIS — R26.9 GAIT ABNORMALITY: ICD-10-CM

## 2023-11-21 RX ORDER — METHYLPREDNISOLONE 4 MG/1
TABLET ORAL
Qty: 21 TABLET | Refills: 0 | Status: SHIPPED | OUTPATIENT
Start: 2023-11-21 | End: 2023-11-28

## 2023-11-21 NOTE — TELEPHONE ENCOUNTER
Pt called in would like xray results and also wanted to know what would be the next step. Good call back number [phone]

## 2023-12-25 DIAGNOSIS — E13.9 DIABETES 1.5, MANAGED AS TYPE 2 (MULTI): ICD-10-CM

## 2023-12-25 DIAGNOSIS — E78.5 DYSLIPIDEMIA: ICD-10-CM

## 2023-12-26 RX ORDER — SIMVASTATIN 10 MG/1
TABLET, FILM COATED ORAL
Qty: 100 TABLET | Refills: 2 | Status: SHIPPED | OUTPATIENT
Start: 2023-12-26

## 2023-12-26 RX ORDER — GLIMEPIRIDE 4 MG/1
TABLET ORAL
Qty: 200 TABLET | Refills: 2 | Status: SHIPPED | OUTPATIENT
Start: 2023-12-26

## 2024-01-11 ENCOUNTER — OFFICE VISIT (OUTPATIENT)
Dept: PRIMARY CARE | Facility: CLINIC | Age: 69
End: 2024-01-11
Payer: MEDICARE

## 2024-01-11 VITALS
DIASTOLIC BLOOD PRESSURE: 82 MMHG | TEMPERATURE: 98 F | RESPIRATION RATE: 18 BRPM | OXYGEN SATURATION: 94 % | HEART RATE: 53 BPM | HEIGHT: 74 IN | WEIGHT: 252 LBS | SYSTOLIC BLOOD PRESSURE: 144 MMHG | BODY MASS INDEX: 32.34 KG/M2

## 2024-01-11 DIAGNOSIS — Z00.00 MEDICARE ANNUAL WELLNESS VISIT, SUBSEQUENT: Primary | ICD-10-CM

## 2024-01-11 DIAGNOSIS — R53.83 MALAISE AND FATIGUE: ICD-10-CM

## 2024-01-11 DIAGNOSIS — E66.01 CLASS 3 SEVERE OBESITY DUE TO EXCESS CALORIES WITHOUT SERIOUS COMORBIDITY IN ADULT, UNSPECIFIED BMI (MULTI): ICD-10-CM

## 2024-01-11 DIAGNOSIS — I10 PRIMARY HYPERTENSION: ICD-10-CM

## 2024-01-11 DIAGNOSIS — R53.81 MALAISE AND FATIGUE: ICD-10-CM

## 2024-01-11 DIAGNOSIS — H47.011 NAION (NON-ARTERITIC ANTERIOR ISCHEMIC OPTIC NEUROPATHY), RIGHT EYE: ICD-10-CM

## 2024-01-11 DIAGNOSIS — E55.9 VITAMIN D DEFICIENCY: ICD-10-CM

## 2024-01-11 DIAGNOSIS — H54.61 VISION LOSS OF RIGHT EYE: ICD-10-CM

## 2024-01-11 DIAGNOSIS — H47.20 OPTIC ATROPHY OF RIGHT EYE: ICD-10-CM

## 2024-01-11 DIAGNOSIS — E11.9 DIABETES MELLITUS WITHOUT COMPLICATION (MULTI): ICD-10-CM

## 2024-01-11 DIAGNOSIS — E11.9 TYPE 2 DIABETES MELLITUS WITHOUT COMPLICATION, WITHOUT LONG-TERM CURRENT USE OF INSULIN (MULTI): ICD-10-CM

## 2024-01-11 DIAGNOSIS — E78.5 DYSLIPIDEMIA: ICD-10-CM

## 2024-01-11 DIAGNOSIS — Z00.00 ROUTINE GENERAL MEDICAL EXAMINATION AT HEALTH CARE FACILITY: ICD-10-CM

## 2024-01-11 PROBLEM — E66.813 CLASS 3 SEVERE OBESITY DUE TO EXCESS CALORIES WITHOUT SERIOUS COMORBIDITY IN ADULT, UNSPECIFIED BMI: Status: ACTIVE | Noted: 2024-01-11

## 2024-01-11 LAB
25(OH)D3 SERPL-MCNC: 38 NG/ML (ref 30–100)
ALBUMIN SERPL BCP-MCNC: 4.6 G/DL (ref 3.4–5)
ALP SERPL-CCNC: 55 U/L (ref 33–136)
ALT SERPL W P-5'-P-CCNC: 33 U/L (ref 10–52)
ANION GAP SERPL CALC-SCNC: 14 MMOL/L (ref 10–20)
AST SERPL W P-5'-P-CCNC: 23 U/L (ref 9–39)
BILIRUB SERPL-MCNC: 1.4 MG/DL (ref 0–1.2)
BUN SERPL-MCNC: 13 MG/DL (ref 6–23)
CALCIUM SERPL-MCNC: 10 MG/DL (ref 8.6–10.6)
CHLORIDE SERPL-SCNC: 99 MMOL/L (ref 98–107)
CHOLEST SERPL-MCNC: 222 MG/DL (ref 0–199)
CHOLESTEROL/HDL RATIO: 3.9
CO2 SERPL-SCNC: 27 MMOL/L (ref 21–32)
CREAT SERPL-MCNC: 0.78 MG/DL (ref 0.5–1.3)
EGFRCR SERPLBLD CKD-EPI 2021: >90 ML/MIN/1.73M*2
ERYTHROCYTE [DISTWIDTH] IN BLOOD BY AUTOMATED COUNT: 12.6 % (ref 11.5–14.5)
GLUCOSE SERPL-MCNC: 192 MG/DL (ref 74–99)
HBA1C MFR BLD: 8.6 % (ref 4.2–6.5)
HCT VFR BLD AUTO: 46.5 % (ref 41–52)
HDLC SERPL-MCNC: 57.6 MG/DL
HGB BLD-MCNC: 15.4 G/DL (ref 13.5–17.5)
LDLC SERPL CALC-MCNC: 128 MG/DL
MCH RBC QN AUTO: 30.3 PG (ref 26–34)
MCHC RBC AUTO-ENTMCNC: 33.1 G/DL (ref 32–36)
MCV RBC AUTO: 91 FL (ref 80–100)
NON HDL CHOLESTEROL: 164 MG/DL (ref 0–149)
NRBC BLD-RTO: 0 /100 WBCS (ref 0–0)
PLATELET # BLD AUTO: 265 X10*3/UL (ref 150–450)
POC FINGERSTICK BLOOD GLUCOSE: 174 MG/DL (ref 70–100)
POTASSIUM SERPL-SCNC: 4.6 MMOL/L (ref 3.5–5.3)
PROT SERPL-MCNC: 7.4 G/DL (ref 6.4–8.2)
RBC # BLD AUTO: 5.09 X10*6/UL (ref 4.5–5.9)
SODIUM SERPL-SCNC: 135 MMOL/L (ref 136–145)
TRIGL SERPL-MCNC: 182 MG/DL (ref 0–149)
TSH SERPL-ACNC: 0.92 MIU/L (ref 0.44–3.98)
VLDL: 36 MG/DL (ref 0–40)
WBC # BLD AUTO: 6.3 X10*3/UL (ref 4.4–11.3)

## 2024-01-11 PROCEDURE — 83036 HEMOGLOBIN GLYCOSYLATED A1C: CPT | Mod: CLIA WAIVED TEST | Performed by: FAMILY MEDICINE

## 2024-01-11 PROCEDURE — 1160F RVW MEDS BY RX/DR IN RCRD: CPT | Performed by: FAMILY MEDICINE

## 2024-01-11 PROCEDURE — 3079F DIAST BP 80-89 MM HG: CPT | Performed by: FAMILY MEDICINE

## 2024-01-11 PROCEDURE — 80053 COMPREHEN METABOLIC PANEL: CPT

## 2024-01-11 PROCEDURE — 82962 GLUCOSE BLOOD TEST: CPT | Performed by: FAMILY MEDICINE

## 2024-01-11 PROCEDURE — 1036F TOBACCO NON-USER: CPT | Performed by: FAMILY MEDICINE

## 2024-01-11 PROCEDURE — 82306 VITAMIN D 25 HYDROXY: CPT

## 2024-01-11 PROCEDURE — 1126F AMNT PAIN NOTED NONE PRSNT: CPT | Performed by: FAMILY MEDICINE

## 2024-01-11 PROCEDURE — 1170F FXNL STATUS ASSESSED: CPT | Performed by: FAMILY MEDICINE

## 2024-01-11 PROCEDURE — 80061 LIPID PANEL: CPT

## 2024-01-11 PROCEDURE — 85027 COMPLETE CBC AUTOMATED: CPT

## 2024-01-11 PROCEDURE — 3052F HG A1C>EQUAL 8.0%<EQUAL 9.0%: CPT | Performed by: FAMILY MEDICINE

## 2024-01-11 PROCEDURE — 99214 OFFICE O/P EST MOD 30 MIN: CPT | Performed by: FAMILY MEDICINE

## 2024-01-11 PROCEDURE — 3077F SYST BP >= 140 MM HG: CPT | Performed by: FAMILY MEDICINE

## 2024-01-11 PROCEDURE — 36415 COLL VENOUS BLD VENIPUNCTURE: CPT

## 2024-01-11 PROCEDURE — 1159F MED LIST DOCD IN RCRD: CPT | Performed by: FAMILY MEDICINE

## 2024-01-11 PROCEDURE — G0439 PPPS, SUBSEQ VISIT: HCPCS | Performed by: FAMILY MEDICINE

## 2024-01-11 PROCEDURE — 84443 ASSAY THYROID STIM HORMONE: CPT

## 2024-01-11 ASSESSMENT — ACTIVITIES OF DAILY LIVING (ADL)
BATHING: INDEPENDENT
TAKING_MEDICATION: INDEPENDENT
MANAGING_FINANCES: INDEPENDENT
GROCERY_SHOPPING: INDEPENDENT
DRESSING: INDEPENDENT
DOING_HOUSEWORK: INDEPENDENT

## 2024-01-11 ASSESSMENT — PATIENT HEALTH QUESTIONNAIRE - PHQ9
2. FEELING DOWN, DEPRESSED OR HOPELESS: NOT AT ALL
2. FEELING DOWN, DEPRESSED OR HOPELESS: SEVERAL DAYS
SUM OF ALL RESPONSES TO PHQ9 QUESTIONS 1 AND 2: 0
SUM OF ALL RESPONSES TO PHQ9 QUESTIONS 1 AND 2: 2
1. LITTLE INTEREST OR PLEASURE IN DOING THINGS: SEVERAL DAYS
1. LITTLE INTEREST OR PLEASURE IN DOING THINGS: NOT AT ALL

## 2024-01-11 ASSESSMENT — ENCOUNTER SYMPTOMS
OCCASIONAL FEELINGS OF UNSTEADINESS: 0
LOSS OF SENSATION IN FEET: 0
DEPRESSION: 1

## 2024-01-11 ASSESSMENT — PAIN SCALES - GENERAL: PAINLEVEL: 0-NO PAIN

## 2024-01-11 NOTE — PROGRESS NOTES
"Subjective   Reason for Visit: Rehan Holman is an 68 y.o. male here for a Medicare Wellness visit.     Past Medical, Surgical, and Family History reviewed and updated in chart.    Reviewed all medications by prescribing practitioner or clinical pharmacist (such as prescriptions, OTCs, herbal therapies and supplements) and documented in the medical record.    HPI  : Patient is a 68-year-old diabetic male who is in today for a Medicare wellness exam and also recheck on his blood sugar and A1c.  Patient will answer the questions as presented by the medical assistant.  He does have a living will and medical power of .  Patient just completed his physical therapy for a right lower leg injury and he is feeling much better and ambulating better.  Patient states he has not been watching his diet during the holidays and he hopes his A1c did not go up.  He also will have complete blood work to check his lipids, liver enzymes and blood counts.    Patient Care Team:  Yadiel Malone DO as PCP - General  Yadiel Malone DO as PCP - [employer] PCP     Review of Systems  :10 systems were reviewed and the information is included in the HPI and no additional review of systems is indicated.    Objective   Vitals:  /82   Pulse 53   Temp 36.7 °C (98 °F)   Resp 18   Ht 1.88 m (6' 2\")   Wt 114 kg (252 lb)   SpO2 94%   BMI 32.35 kg/m²       Physical Exam  Vitals and nursing note reviewed.   Constitutional:       Appearance: Normal appearance. He is obese.      Comments: Patient is alert and oriented x3.   No acute distress.  Patient states he is starting to exercise again and will be watching his diet more closely.   HENT:      Head: Normocephalic.      Right Ear: Tympanic membrane and external ear normal.      Left Ear: Tympanic membrane and external ear normal.      Ears:      Comments: Ears are patent bilaterally and TMs are clear.     Nose: Nose normal.      Mouth/Throat:      Mouth: Mucous " membranes are moist.      Pharynx: Oropharynx is clear.      Comments: Mouth is moist, tongue is midline.  No posterior pharyngeal erythema.  Eyes:      Extraocular Movements: Extraocular movements intact.      Conjunctiva/sclera: Conjunctivae normal.      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    He also has a cataract in the left eye but it is not ready to be removed yet.  He does follow with a retinal doctor and ophthalmology.   Neck:      Comments: No carotid bruits, no thyromegaly, no cervical adenopathy.  Occasional neck spasm and restriction of motion secondary to stress and tension.  Cardiovascular:      Rate and Rhythm: Normal rate and regular rhythm.      Pulses: Normal pulses.      Heart sounds: Normal heart sounds.      Comments: Patient denies chest pain and no palpitations.  Heart rhythm is stable S1 and S2 are noted, no ectopics.  Pulmonary:      Effort: Pulmonary effort is normal.      Breath sounds: Normal breath sounds.      Comments: Patient denies shortness of breath.  Patient denies any coughing or wheezing.  Lungs are clear to auscultation.    Abdominal:      General: Bowel sounds are normal.      Palpations: Abdomen is soft.      Comments: Abdomen is soft and mildly obese but non tender. No flank tenderness.  No suprapubic pain.    No abdominal guarding and no rebound tenderness.  Patient did have a colonoscopy 3 years ago.   Genitourinary:     Comments: Patient denies dysuria, no hematuria, denies flank pain.  Occasional nocturia.  Patient does have a history of Peyronie's disease.  Musculoskeletal:         General: Tenderness present.      Cervical back: Normal range of motion.      Comments: Patient  had 2 previous lumbar spine surgeries and is currently stable.   He also recently finished physical therapy for a right lower leg injury that is feeling much better and he plans on starting to exercise again.  He Does have some age-related arthritis in the hips  and knees.  Also has some problems with his feet and does see podiatry.   Skin:     General: Skin is warm.      Comments: There is no bruising, no erythema, no skin lesions noted, no rashes.   Neurological:      Mental Status: He is alert and oriented to person, place, and time. Mental status is at baseline.      Sensory: Sensory deficit present.      Comments: Patient does have some paresthesias in his lower extremities and some mild diabetic neuropathy.  Coordination and gait are stable.  Normal muscle strength upper and lower extremities.   Psychiatric:         Behavior: Behavior normal.         Thought Content: Thought content normal.         Judgment: Judgment normal.      Comments: Patient has normal mood and affect.  Thought content and judgment are stable.  No signs of vascular dementia.  Behavior is normal.     PLAN : Patient is a 68-year-old diabetic male who was evaluated today for his Medicare wellness exam.  He did answer the questions as presented by the medical assistant.  He does have a living will and medical power of .  Today his blood sugar was 174 and his A1c did go up to 8.6% which is quite a bit higher than his last A1c  of 6.1%.  Patient will be notified of his other blood results in 4 days and further recommendations will be made at that time.  We also discussed getting back on his diet  since the  holidays are over,  and he states he is going to start exercising again since he completed physical therapy for his right lower leg.  Patient otherwise is stable we will follow-up in 3 to 4 months or sooner if needed.            Assessment/Plan   Problem List Items Addressed This Visit       Type 2 diabetes mellitus (CMS/[facility])    Vitamin D deficiency    Relevant Orders    Vitamin D 25-Hydroxy,Total (for eval of Vitamin D levels)    Vision loss of right eye    Optic atrophy of right eye    NAION (non-arteritic anterior ischemic optic neuropathy), right eye    Malaise and fatigue    Relevant  Orders    CBC    Comprehensive Metabolic Panel    Lipid Panel    Thyroid Stimulating Hormone    Hypertension    Relevant Orders    CBC    Comprehensive Metabolic Panel    Lipid Panel    Thyroid Stimulating Hormone    Dyslipidemia    Relevant Orders    CBC    Comprehensive Metabolic Panel    Lipid Panel    Thyroid Stimulating Hormone    Diabetes mellitus without complication (CMS/HCC)    Relevant Orders    POCT fingerstick glucose manually resulted (Completed)    POCT Glycosylated Hemoglobin (HGB A1C) docked device    Medicare annual wellness visit, subsequent - Primary    Class 3 severe obesity due to excess calories without serious comorbidity in adult, unspecified BMI (CMS/HCC)     Other Visit Diagnoses       Routine general medical examination at health care facility

## 2024-01-14 NOTE — RESULT ENCOUNTER NOTE
Kidney and liver function are stable       cholesterol is 222 and should be under 200    triglycerides are 182 and should be under 150       Red and white blood cell counts are normal thyroid function is normal        Vitamin D is normal at 38      He  needs to to get back on his diet and watch the diabetes and try to lower the cholesterol and fats         other blood work is stable

## 2024-02-23 DIAGNOSIS — F32.1 CURRENT MODERATE EPISODE OF MAJOR DEPRESSIVE DISORDER WITHOUT PRIOR EPISODE (MULTI): ICD-10-CM

## 2024-02-25 RX ORDER — FLUOXETINE HYDROCHLORIDE 40 MG/1
CAPSULE ORAL
Qty: 180 CAPSULE | Refills: 3 | Status: SHIPPED | OUTPATIENT
Start: 2024-02-25

## 2024-03-29 DIAGNOSIS — M54.17 LUMBOSACRAL RADICULITIS: ICD-10-CM

## 2024-04-01 RX ORDER — GABAPENTIN 300 MG/1
CAPSULE ORAL
Qty: 200 CAPSULE | Refills: 2 | Status: SHIPPED | OUTPATIENT
Start: 2024-04-01

## 2024-04-01 ASSESSMENT — ENCOUNTER SYMPTOMS
HUNGER: 0
SEIZURES: 0
WEAKNESS: 0
WEIGHT LOSS: 1
FATIGUE: 0
NERVOUS/ANXIOUS: 0
TREMORS: 0
SWEATS: 0
POLYDIPSIA: 0
POLYPHAGIA: 0
DIZZINESS: 1
CONFUSION: 0
VISUAL CHANGE: 1
SPEECH DIFFICULTY: 0
BLACKOUTS: 0
BLURRED VISION: 0
HEADACHES: 0

## 2024-04-03 ENCOUNTER — OFFICE VISIT (OUTPATIENT)
Dept: PRIMARY CARE | Facility: CLINIC | Age: 69
End: 2024-04-03
Payer: MEDICARE

## 2024-04-03 VITALS
DIASTOLIC BLOOD PRESSURE: 70 MMHG | OXYGEN SATURATION: 95 % | BODY MASS INDEX: 32.6 KG/M2 | RESPIRATION RATE: 18 BRPM | SYSTOLIC BLOOD PRESSURE: 138 MMHG | WEIGHT: 254 LBS | HEIGHT: 74 IN | TEMPERATURE: 97.9 F | HEART RATE: 50 BPM

## 2024-04-03 DIAGNOSIS — E11.9 DIABETES MELLITUS WITHOUT COMPLICATION (MULTI): ICD-10-CM

## 2024-04-03 DIAGNOSIS — H47.011 NAION (NON-ARTERITIC ANTERIOR ISCHEMIC OPTIC NEUROPATHY), RIGHT EYE: Primary | ICD-10-CM

## 2024-04-03 DIAGNOSIS — N48.6 PEYRONIE DISEASE: ICD-10-CM

## 2024-04-03 DIAGNOSIS — F41.8 ANXIETY ASSOCIATED WITH DEPRESSION: ICD-10-CM

## 2024-04-03 DIAGNOSIS — E66.01 CLASS 3 SEVERE OBESITY DUE TO EXCESS CALORIES WITHOUT SERIOUS COMORBIDITY IN ADULT, UNSPECIFIED BMI (MULTI): ICD-10-CM

## 2024-04-03 DIAGNOSIS — H47.20 OPTIC ATROPHY OF RIGHT EYE: ICD-10-CM

## 2024-04-03 LAB — POC FINGERSTICK BLOOD GLUCOSE: 127 MG/DL (ref 70–100)

## 2024-04-03 PROCEDURE — 83036 HEMOGLOBIN GLYCOSYLATED A1C: CPT | Mod: CLIA WAIVED TEST | Performed by: FAMILY MEDICINE

## 2024-04-03 PROCEDURE — 3075F SYST BP GE 130 - 139MM HG: CPT | Performed by: FAMILY MEDICINE

## 2024-04-03 PROCEDURE — 99214 OFFICE O/P EST MOD 30 MIN: CPT | Performed by: FAMILY MEDICINE

## 2024-04-03 PROCEDURE — 1126F AMNT PAIN NOTED NONE PRSNT: CPT | Performed by: FAMILY MEDICINE

## 2024-04-03 PROCEDURE — 3049F LDL-C 100-129 MG/DL: CPT | Performed by: FAMILY MEDICINE

## 2024-04-03 PROCEDURE — 1036F TOBACCO NON-USER: CPT | Performed by: FAMILY MEDICINE

## 2024-04-03 PROCEDURE — 3078F DIAST BP <80 MM HG: CPT | Performed by: FAMILY MEDICINE

## 2024-04-03 PROCEDURE — 3052F HG A1C>EQUAL 8.0%<EQUAL 9.0%: CPT | Performed by: FAMILY MEDICINE

## 2024-04-03 PROCEDURE — 1160F RVW MEDS BY RX/DR IN RCRD: CPT | Performed by: FAMILY MEDICINE

## 2024-04-03 PROCEDURE — 82962 GLUCOSE BLOOD TEST: CPT | Performed by: FAMILY MEDICINE

## 2024-04-03 PROCEDURE — 1159F MED LIST DOCD IN RCRD: CPT | Performed by: FAMILY MEDICINE

## 2024-04-03 ASSESSMENT — PATIENT HEALTH QUESTIONNAIRE - PHQ9
1. LITTLE INTEREST OR PLEASURE IN DOING THINGS: NOT AT ALL
SUM OF ALL RESPONSES TO PHQ9 QUESTIONS 1 AND 2: 0
2. FEELING DOWN, DEPRESSED OR HOPELESS: NOT AT ALL

## 2024-04-03 ASSESSMENT — PAIN SCALES - GENERAL: PAINLEVEL: 0-NO PAIN

## 2024-04-03 NOTE — PROGRESS NOTES
Subjective   Rehan Holman is a 68 y.o. male who presents for Follow-up (Follow up visit for diabetes).    HPI:   Patient is a 68 year old  diabetic male  who needs his   blood sugar and A1c rechecked.  Patient states that his last A1c was elevated because he had forgotten to take his diabetic pill and never had it refilled.  He is now back on his glimepiride and is watching his diet closely and his sugars have been much better.  He does not exercise much but he does stay busy working around his house and he does go to the box stores and walks around for many hours in order to get exercise.    Objective  : ROS :10 systems were reviewed and the information is included in the HPI and no additional review of systems is indicated.    Physical Exam  Vitals and nursing note reviewed.   Constitutional:       Appearance: Normal appearance. He is obese.      Comments: Patient is alert and oriented x3.   No acute distress.  Is trying to diet and lose weight.   HENT:      Head: Normocephalic.      Right Ear: Tympanic membrane and external ear normal.      Left Ear: Tympanic membrane and external ear normal.      Ears:      Comments: Ears are patent bilaterally and TMs are clear.     Nose: Nose normal.      Mouth/Throat:      Mouth: Mucous membranes are moist.      Pharynx: Oropharynx is clear.      Comments: Mouth is moist, tongue is midline.  No posterior pharyngeal erythema.  Eyes:      Extraocular Movements: Extraocular movements intact.      Conjunctiva/sclera: Conjunctivae normal.      Pupils: Pupils are equal, round, and reactive to light.      Comments: Blindness Right  eye  due to  Naion   vision loss right eye.   Neck:      Comments: Mild restriction of motion cervical spine due to degenerative disc disease and secondary spasm.  No carotid bruits, no thyromegaly, no cervical adenopathy.    Cardiovascular:      Rate and Rhythm: Normal rate and regular rhythm.      Pulses: Normal pulses.      Heart sounds: Normal heart  sounds.      Comments: Patient denies chest pain and no palpitations.  Heart rhythm is stable S1 and S2 are noted, no ectopics.  Pulmonary:      Effort: Pulmonary effort is normal.      Breath sounds: Normal breath sounds.      Comments: Patient denies any coughing or wheezing.  Lungs are clear to auscultation.    Abdominal:      General: Bowel sounds are normal.      Palpations: Abdomen is soft.      Comments: Abdomen is soft and  mildly obese.     Non tender.  No flank tenderness.  No suprapubic pain.  Positive bowel sounds x4.  No abdominal guarding and no rebound tenderness.   Genitourinary:     Comments: History  of Peyronies dx, and seeing Urology.  Occasional frequency and nocturia.  Musculoskeletal:         General: Tenderness present.      Cervical back: Normal range of motion.      Comments: Previous lumbar spine  surgery X 2 .Age-related arthritis in the joints.  Mild restriction of motion cervical and lumbar spines due to muscle spasm.  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  Ambulates without any assistive device.  Patient has had previous carpal tunnel syndrome both wrists.  Left 3 rd  trigger finger.   Skin:     General: Skin is warm and dry.      Comments: There is no bruising, no erythema, no skin lesions noted, no rashes.   Neurological:      Mental Status: He is alert and oriented to person, place, and time. Mental status is at baseline.      Sensory: Sensory deficit present.      Comments: Stable sciatica.  Paresthesias legs   from lumbosacral disc dx.  No focal neurosensory deficits are noted. Mild  Diabetic neuropathy.   Coordination and gait are stable.  Normal muscle strength upper and lower extremities.   Psychiatric:         Behavior: Behavior normal.         Thought Content: Thought content normal.         Judgment: Judgment normal.      Comments: Patient has normal mood and affect.  Thought content and judgment are stable.  No signs of vascular dementia.  Behavior is normal.     PLAN  : Patient is a 68-year-old diabetic male who was evaluated for his diabetes and A1c.  He states that he realized he had not been taking his diabetic pill last visit and that is why his A1c went up to a high of 8.6%.  He is now back to taking his Amaryl and he is watching his diet closely and his blood sugar today was 127 and his A1c was 6.6%.  Patient otherwise is doing well and will continue to watch what he eats and return in 3 to 4 months for recheck.  I did encourage him to stick with his diet since he is doing so well and if he continues losing weight we may be able to get him off his glimepiride.  Patient otherwise is stable and will follow-up in 3 to 4 months.    Problem List Items Addressed This Visit       Diabetes mellitus without complication (CMS/[facility])    Relevant Orders    POCT fingerstick glucose manually resulted    POCT Glycosylated Hemoglobin (HGB A1C) docked device            Yadiel Malone, DO

## 2024-04-08 ENCOUNTER — APPOINTMENT (OUTPATIENT)
Dept: PRIMARY CARE | Facility: CLINIC | Age: 69
End: 2024-04-08
Payer: MEDICARE

## 2024-04-15 LAB — HBA1C MFR BLD: 6.6 % (ref 4.2–6.5)

## 2024-04-18 ENCOUNTER — APPOINTMENT (OUTPATIENT)
Dept: PRIMARY CARE | Facility: CLINIC | Age: 69
End: 2024-04-18
Payer: MEDICARE

## 2024-05-10 DIAGNOSIS — I10 PRIMARY HYPERTENSION: ICD-10-CM

## 2024-05-10 DIAGNOSIS — K21.00 GASTROESOPHAGEAL REFLUX DISEASE WITH ESOPHAGITIS WITHOUT HEMORRHAGE: ICD-10-CM

## 2024-05-13 RX ORDER — METOPROLOL SUCCINATE 50 MG/1
50 TABLET, EXTENDED RELEASE ORAL DAILY
Qty: 100 TABLET | Refills: 2 | Status: SHIPPED | OUTPATIENT
Start: 2024-05-13

## 2024-05-13 RX ORDER — AMLODIPINE AND BENAZEPRIL HYDROCHLORIDE 5; 40 MG/1; MG/1
1 CAPSULE ORAL DAILY
Qty: 100 CAPSULE | Refills: 2 | Status: SHIPPED | OUTPATIENT
Start: 2024-05-13

## 2024-05-13 RX ORDER — PANTOPRAZOLE SODIUM 40 MG/1
40 TABLET, DELAYED RELEASE ORAL DAILY
Qty: 100 TABLET | Refills: 2 | Status: SHIPPED | OUTPATIENT
Start: 2024-05-13

## 2024-05-16 ENCOUNTER — TELEPHONE (OUTPATIENT)
Dept: PRIMARY CARE | Facility: CLINIC | Age: 69
End: 2024-05-16
Payer: MEDICARE

## 2024-05-16 DIAGNOSIS — M54.50 ACUTE BILATERAL LOW BACK PAIN WITHOUT SCIATICA: ICD-10-CM

## 2024-05-28 RX ORDER — METHOCARBAMOL 500 MG/1
500 TABLET, FILM COATED ORAL 3 TIMES DAILY
Qty: 60 TABLET | Refills: 2 | Status: SHIPPED | OUTPATIENT
Start: 2024-05-28 | End: 2024-07-27

## 2024-06-13 NOTE — TELEPHONE ENCOUNTER
Patient phoned requesting referral to Chiropractor, [facility] does  not refer to Chiropractors, uses orthopedic or pain management. Patient will need to find a Chiropractor on his own

## 2024-07-10 ENCOUNTER — APPOINTMENT (OUTPATIENT)
Dept: PRIMARY CARE | Facility: CLINIC | Age: 69
End: 2024-07-10
Payer: MEDICARE

## 2024-07-10 VITALS
BODY MASS INDEX: 32.47 KG/M2 | WEIGHT: 253 LBS | HEIGHT: 74 IN | SYSTOLIC BLOOD PRESSURE: 142 MMHG | HEART RATE: 54 BPM | DIASTOLIC BLOOD PRESSURE: 73 MMHG | RESPIRATION RATE: 18 BRPM | OXYGEN SATURATION: 95 % | TEMPERATURE: 97.9 F

## 2024-07-10 DIAGNOSIS — H47.20 OPTIC ATROPHY OF RIGHT EYE: ICD-10-CM

## 2024-07-10 DIAGNOSIS — E78.5 DYSLIPIDEMIA: ICD-10-CM

## 2024-07-10 DIAGNOSIS — E78.2 MIXED HYPERLIPIDEMIA: ICD-10-CM

## 2024-07-10 DIAGNOSIS — E11.9 DIABETES MELLITUS WITHOUT COMPLICATION (MULTI): Primary | ICD-10-CM

## 2024-07-10 DIAGNOSIS — H47.011 NAION (NON-ARTERITIC ANTERIOR ISCHEMIC OPTIC NEUROPATHY), RIGHT EYE: ICD-10-CM

## 2024-07-10 DIAGNOSIS — M65.342 TRIGGER RING FINGER OF LEFT HAND: ICD-10-CM

## 2024-07-10 DIAGNOSIS — E66.9 OBESITY (BMI 30.0-34.9): ICD-10-CM

## 2024-07-10 DIAGNOSIS — I10 PRIMARY HYPERTENSION: ICD-10-CM

## 2024-07-10 LAB
ALBUMIN SERPL BCP-MCNC: 4.4 G/DL (ref 3.4–5)
ALP SERPL-CCNC: 55 U/L (ref 33–136)
ALT SERPL W P-5'-P-CCNC: 31 U/L (ref 10–52)
ANION GAP SERPL CALC-SCNC: 15 MMOL/L (ref 10–20)
AST SERPL W P-5'-P-CCNC: 21 U/L (ref 9–39)
BILIRUB SERPL-MCNC: 1.1 MG/DL (ref 0–1.2)
BUN SERPL-MCNC: 11 MG/DL (ref 6–23)
CALCIUM SERPL-MCNC: 9.4 MG/DL (ref 8.6–10.6)
CHLORIDE SERPL-SCNC: 100 MMOL/L (ref 98–107)
CHOLEST SERPL-MCNC: 220 MG/DL (ref 0–199)
CHOLESTEROL/HDL RATIO: 4.2
CO2 SERPL-SCNC: 26 MMOL/L (ref 21–32)
CREAT SERPL-MCNC: 0.75 MG/DL (ref 0.5–1.3)
EGFRCR SERPLBLD CKD-EPI 2021: >90 ML/MIN/1.73M*2
ERYTHROCYTE [DISTWIDTH] IN BLOOD BY AUTOMATED COUNT: 13.2 % (ref 11.5–14.5)
GLUCOSE SERPL-MCNC: 111 MG/DL (ref 74–99)
HBA1C MFR BLD: 5.7 % (ref 4.2–6.5)
HCT VFR BLD AUTO: 46.7 % (ref 41–52)
HDLC SERPL-MCNC: 52.9 MG/DL
HGB BLD-MCNC: 14.9 G/DL (ref 13.5–17.5)
LDLC SERPL CALC-MCNC: 118 MG/DL
MCH RBC QN AUTO: 30.3 PG (ref 26–34)
MCHC RBC AUTO-ENTMCNC: 31.9 G/DL (ref 32–36)
MCV RBC AUTO: 95 FL (ref 80–100)
NON HDL CHOLESTEROL: 167 MG/DL (ref 0–149)
NRBC BLD-RTO: 0 /100 WBCS (ref 0–0)
PLATELET # BLD AUTO: 278 X10*3/UL (ref 150–450)
POC FINGERSTICK BLOOD GLUCOSE: 108 MG/DL (ref 70–100)
POTASSIUM SERPL-SCNC: 4.3 MMOL/L (ref 3.5–5.3)
PROT SERPL-MCNC: 6.8 G/DL (ref 6.4–8.2)
RBC # BLD AUTO: 4.91 X10*6/UL (ref 4.5–5.9)
SODIUM SERPL-SCNC: 137 MMOL/L (ref 136–145)
TRIGL SERPL-MCNC: 247 MG/DL (ref 0–149)
VLDL: 49 MG/DL (ref 0–40)
WBC # BLD AUTO: 6.6 X10*3/UL (ref 4.4–11.3)

## 2024-07-10 PROCEDURE — 80061 LIPID PANEL: CPT

## 2024-07-10 PROCEDURE — 3044F HG A1C LEVEL LT 7.0%: CPT | Performed by: FAMILY MEDICINE

## 2024-07-10 PROCEDURE — 36415 COLL VENOUS BLD VENIPUNCTURE: CPT

## 2024-07-10 PROCEDURE — 3049F LDL-C 100-129 MG/DL: CPT | Performed by: FAMILY MEDICINE

## 2024-07-10 PROCEDURE — 83036 HEMOGLOBIN GLYCOSYLATED A1C: CPT | Mod: CLIA WAIVED TEST | Performed by: FAMILY MEDICINE

## 2024-07-10 PROCEDURE — 20604 DRAIN/INJ JOINT/BURSA W/US: CPT | Performed by: FAMILY MEDICINE

## 2024-07-10 PROCEDURE — 1126F AMNT PAIN NOTED NONE PRSNT: CPT | Performed by: FAMILY MEDICINE

## 2024-07-10 PROCEDURE — 82962 GLUCOSE BLOOD TEST: CPT | Performed by: FAMILY MEDICINE

## 2024-07-10 PROCEDURE — 80053 COMPREHEN METABOLIC PANEL: CPT

## 2024-07-10 PROCEDURE — 3078F DIAST BP <80 MM HG: CPT | Performed by: FAMILY MEDICINE

## 2024-07-10 PROCEDURE — 3077F SYST BP >= 140 MM HG: CPT | Performed by: FAMILY MEDICINE

## 2024-07-10 PROCEDURE — 85027 COMPLETE CBC AUTOMATED: CPT

## 2024-07-10 PROCEDURE — 1160F RVW MEDS BY RX/DR IN RCRD: CPT | Performed by: FAMILY MEDICINE

## 2024-07-10 PROCEDURE — 1036F TOBACCO NON-USER: CPT | Performed by: FAMILY MEDICINE

## 2024-07-10 PROCEDURE — 1159F MED LIST DOCD IN RCRD: CPT | Performed by: FAMILY MEDICINE

## 2024-07-10 RX ORDER — LIDOCAINE HYDROCHLORIDE 10 MG/ML
2 INJECTION INFILTRATION; PERINEURAL
Status: COMPLETED | OUTPATIENT
Start: 2024-07-10 | End: 2024-07-10

## 2024-07-10 ASSESSMENT — PATIENT HEALTH QUESTIONNAIRE - PHQ9
1. LITTLE INTEREST OR PLEASURE IN DOING THINGS: NOT AT ALL
2. FEELING DOWN, DEPRESSED OR HOPELESS: NOT AT ALL
SUM OF ALL RESPONSES TO PHQ9 QUESTIONS 1 AND 2: 0

## 2024-07-10 ASSESSMENT — PAIN SCALES - GENERAL: PAINLEVEL: 0-NO PAIN

## 2024-07-10 NOTE — PROGRESS NOTES
Subjective   Rehan Holman is a 69 y.o. male who presents for Follow-up (Follow up visit for diabetes).    HPI  : Patient is a 69-year-old diabetic male who is in for recheck on his blood sugar and A1c, he also needs complete blood work for his lipids, blood counts and liver enzymes.  He states he has been watching his diet better and is also trying to lose weight.  He is hoping his A1c has improved from last visit.  He also has a fourth digit trigger finger on the left hand and was hoping to have a cortisone shot since the finger is painful and gets stuck with motion and movement.    Objective  : ROS : 10 systems were reviewed and the information is included in the HPI and no additional review of systems is indicated.    Physical Exam  Vitals and nursing note reviewed.   Constitutional:       Appearance: Normal appearance. He is obese.      Comments: Patient is alert and oriented x3.   No acute distress and continues to try to watch his diet and lose some weight.   HENT:      Head: Normocephalic.      Right Ear: Tympanic membrane and external ear normal.      Left Ear: Tympanic membrane and external ear normal.      Ears:      Comments: Ears are patent bilaterally and TMs are clear.     Nose: Rhinorrhea present.      Comments: Allergic rhinitis from outdoor allergens.     Mouth/Throat:      Mouth: Mucous membranes are moist.      Pharynx: Oropharynx is clear.      Comments: Mouth is moist, tongue is midline.  No posterior pharyngeal erythema.  Eyes:      Extraocular Movements: Extraocular movements intact.      Conjunctiva/sclera: Conjunctivae normal.      Pupils: Pupils are equal, round, and reactive to light.      Comments: Optic neuritis right eye . Legally blind right eye.   Follows with Retinal specialist.   Neck:      Comments: No carotid bruits, no thyromegaly, no cervical adenopathy.  Neck spasm and restriction of motion secondary to  arthritis ,  stress and tension.  Cardiovascular:      Rate and Rhythm:  Normal rate and regular rhythm.      Pulses: Normal pulses.      Heart sounds: Normal heart sounds.      Comments: Patient denies chest pain and no palpitations.  Heart rhythm is stable S1 and S2 are noted.   Pulmonary:      Effort: Pulmonary effort is normal.      Breath sounds: Normal breath sounds.      Comments: Patient denies any coughing or wheezing.  Lungs are clear to auscultation.  Patient denies shortness of breath.  Abdominal:      General: Bowel sounds are normal.      Palpations: Abdomen is soft.      Comments: Abdomen is soft and obese  , and non tender  No flank tenderness.  No suprapubic pain.  Positive bowel sounds .  No abdominal guarding and no rebound tenderness.   Genitourinary:     Comments: Patient denies dysuria, no hematuria, no nocturia, denies flank pain.  Patient has seen urology in the recent past due to Peyronie's disease  Musculoskeletal:         General: Tenderness present. Normal range of motion.      Cervical back: Normal range of motion. Tenderness present.      Comments: Patient would like a cortisone shot for his trigger finger  left hand  4 th  digit . Two  previous Lumbar  back surgeries.  Age-related arthritis in the joints.  Restriction of motion cervical and lumbar spines due to  arthritis and  muscle spasm.   Skin:     General: Skin is warm.      Comments: There is no bruising, no erythema, no skin lesions noted, no rashes.   Neurological:      General: No focal deficit present.      Mental Status: He is alert and oriented to person, place, and time. Mental status is at baseline.      Sensory: Sensory deficit present.      Comments: Occasional paresthesias in the lower extremities from his lumbosacral disc disease.  Mild peripheral  diabetic neuropathy .     Coordination and gait are stable.  Normal muscle strength upper and lower extremities.   Psychiatric:         Mood and Affect: Mood normal.         Behavior: Behavior normal.         Thought Content: Thought content  normal.         Judgment: Judgment normal.      Comments: Patient has anxious mood and affect.  His Mother is terminal and in the nursing home.  Thought content and judgment are stable.  No signs of vascular dementia.  Behavior is normal.     Patient ID: Rehan Holman is a 69 y.o. male.    Injection tendon or ligament: L long A1 for trigger finger on 7/10/2024 11:32 AM  Indications: pain, tendon swelling and therapeutic  Details: 27 G needle  Medications: 2.5 mg triamcinolone acetonide 10 mg/mL; 2 mL lidocaine 10 mg/mL (1 %)    Betadine cleanse of the left fourth digit metacarpal phalangeal joint region.  2.5 mg Kenalog plus to mL lidocaine 1% was injected into the base of the fourth digit at both elbows the trigger finger without complications or problems.  Sterile Band-Aid was applied and the patient tolerated the procedure well.  Procedure, treatment alternatives, risks and benefits explained, specific risks discussed. Consent was given by the patient. Immediately prior to procedure a time out was called to verify the correct patient, procedure, equipment, support staff and site/side marked as required. Patient was prepped and draped in the usual sterile fashion.       PLAN : Patient is a 69-year-old diabetic male who was evaluated today for complete blood work and also recheck on his blood sugar and A1c.  As noted above he did receive a cortisone injection in the base of the left fourth digit for trigger finger.  There were no complications or problems and this is noted in the procedure note above.  Today his blood sugar was 108 and his A1c had improved to 5.7%.  He will continue to try to watch his diet and lose weight.  We will be notified of his other blood results in 4 days and further recommendations will be made at that time.  He otherwise is stable and will follow-up in 3 to 4 months.    Problem List Items Addressed This Visit       Hypertension    Relevant Orders    CBC    Comprehensive Metabolic Panel     Lipid Panel    POCT fingerstick glucose manually resulted    POCT Glycosylated Hemoglobin (HGB A1C) docked device    Hyperlipidemia    Relevant Orders    CBC    Comprehensive Metabolic Panel    Lipid Panel    POCT fingerstick glucose manually resulted    POCT Glycosylated Hemoglobin (HGB A1C) docked device    Dyslipidemia    Relevant Orders    CBC    Comprehensive Metabolic Panel    Lipid Panel    POCT fingerstick glucose manually resulted    POCT Glycosylated Hemoglobin (HGB A1C) docked device    Diabetes mellitus without complication (Multi)    Relevant Orders    CBC    Comprehensive Metabolic Panel    Lipid Panel    POCT fingerstick glucose manually resulted    POCT Glycosylated Hemoglobin (HGB A1C) docked device            Yadiel Malone DO

## 2024-07-12 NOTE — RESULT ENCOUNTER NOTE
Kidney and liver function are normal    cholesterol was borderline at 220    triglycerides are 247      and should be under 150    watch  the fats in  the diet     Red and white blood cell counts are normal    and diabetes is under good control       .  Just  watch  the   fats and cholesterol in the diet

## 2024-07-15 DIAGNOSIS — M65.342 TRIGGER RING FINGER OF LEFT HAND: ICD-10-CM

## 2024-09-05 DIAGNOSIS — E13.9 DIABETES 1.5, MANAGED AS TYPE 2 (MULTI): ICD-10-CM

## 2024-09-05 DIAGNOSIS — E78.5 DYSLIPIDEMIA: ICD-10-CM

## 2024-09-06 RX ORDER — SIMVASTATIN 10 MG/1
TABLET, FILM COATED ORAL
Qty: 100 TABLET | Refills: 2 | Status: SHIPPED | OUTPATIENT
Start: 2024-09-06

## 2024-09-06 RX ORDER — GLIMEPIRIDE 4 MG/1
TABLET ORAL
Qty: 200 TABLET | Refills: 2 | Status: SHIPPED | OUTPATIENT
Start: 2024-09-06

## 2024-10-02 ENCOUNTER — APPOINTMENT (OUTPATIENT)
Dept: PRIMARY CARE | Facility: CLINIC | Age: 69
End: 2024-10-02
Payer: MEDICARE

## 2024-10-02 VITALS — SYSTOLIC BLOOD PRESSURE: 140 MMHG | DIASTOLIC BLOOD PRESSURE: 70 MMHG

## 2024-10-02 DIAGNOSIS — M54.17 LUMBOSACRAL NEURITIS: ICD-10-CM

## 2024-10-02 DIAGNOSIS — R53.83 MALAISE AND FATIGUE: ICD-10-CM

## 2024-10-02 DIAGNOSIS — N40.1 BENIGN PROSTATIC HYPERPLASIA WITH NOCTURIA: ICD-10-CM

## 2024-10-02 DIAGNOSIS — E78.5 DYSLIPIDEMIA: ICD-10-CM

## 2024-10-02 DIAGNOSIS — E11.9 DIABETES MELLITUS WITHOUT COMPLICATION (MULTI): ICD-10-CM

## 2024-10-02 DIAGNOSIS — Z23 NEEDS FLU SHOT: ICD-10-CM

## 2024-10-02 DIAGNOSIS — H54.61 VISION LOSS OF RIGHT EYE: ICD-10-CM

## 2024-10-02 DIAGNOSIS — N48.6 PEYRONIE DISEASE: ICD-10-CM

## 2024-10-02 DIAGNOSIS — R53.81 MALAISE AND FATIGUE: ICD-10-CM

## 2024-10-02 DIAGNOSIS — I10 PRIMARY HYPERTENSION: ICD-10-CM

## 2024-10-02 DIAGNOSIS — E11.9 TYPE 2 DIABETES MELLITUS WITHOUT COMPLICATION, WITHOUT LONG-TERM CURRENT USE OF INSULIN (MULTI): Primary | ICD-10-CM

## 2024-10-02 DIAGNOSIS — Z12.5 SCREENING PSA (PROSTATE SPECIFIC ANTIGEN): ICD-10-CM

## 2024-10-02 DIAGNOSIS — R35.1 BENIGN PROSTATIC HYPERPLASIA WITH NOCTURIA: ICD-10-CM

## 2024-10-02 LAB
ALBUMIN SERPL BCP-MCNC: 4.4 G/DL (ref 3.4–5)
ALP SERPL-CCNC: 58 U/L (ref 33–136)
ALT SERPL W P-5'-P-CCNC: 39 U/L (ref 10–52)
ANION GAP SERPL CALC-SCNC: 12 MMOL/L (ref 10–20)
AST SERPL W P-5'-P-CCNC: 28 U/L (ref 9–39)
BILIRUB SERPL-MCNC: 1.1 MG/DL (ref 0–1.2)
BUN SERPL-MCNC: 13 MG/DL (ref 6–23)
CALCIUM SERPL-MCNC: 9.5 MG/DL (ref 8.6–10.6)
CHLORIDE SERPL-SCNC: 101 MMOL/L (ref 98–107)
CHOLEST SERPL-MCNC: 221 MG/DL (ref 0–199)
CHOLESTEROL/HDL RATIO: 3.7
CO2 SERPL-SCNC: 30 MMOL/L (ref 21–32)
CREAT SERPL-MCNC: 0.74 MG/DL (ref 0.5–1.3)
EGFRCR SERPLBLD CKD-EPI 2021: >90 ML/MIN/1.73M*2
ERYTHROCYTE [DISTWIDTH] IN BLOOD BY AUTOMATED COUNT: 13 % (ref 11.5–14.5)
GLUCOSE SERPL-MCNC: 92 MG/DL (ref 74–99)
HCT VFR BLD AUTO: 45.1 % (ref 41–52)
HDLC SERPL-MCNC: 59.8 MG/DL
HGB BLD-MCNC: 14.7 G/DL (ref 13.5–17.5)
LDLC SERPL CALC-MCNC: 124 MG/DL
MCH RBC QN AUTO: 30.1 PG (ref 26–34)
MCHC RBC AUTO-ENTMCNC: 32.6 G/DL (ref 32–36)
MCV RBC AUTO: 92 FL (ref 80–100)
NON HDL CHOLESTEROL: 161 MG/DL (ref 0–149)
NRBC BLD-RTO: 0 /100 WBCS (ref 0–0)
PLATELET # BLD AUTO: 272 X10*3/UL (ref 150–450)
POC FINGERSTICK BLOOD GLUCOSE: 99 MG/DL (ref 70–100)
POTASSIUM SERPL-SCNC: 4.3 MMOL/L (ref 3.5–5.3)
PROT SERPL-MCNC: 7.2 G/DL (ref 6.4–8.2)
PSA SERPL-MCNC: 0.59 NG/ML
RBC # BLD AUTO: 4.89 X10*6/UL (ref 4.5–5.9)
SODIUM SERPL-SCNC: 139 MMOL/L (ref 136–145)
TRIGL SERPL-MCNC: 187 MG/DL (ref 0–149)
TSH SERPL-ACNC: 1.45 MIU/L (ref 0.44–3.98)
VLDL: 37 MG/DL (ref 0–40)
WBC # BLD AUTO: 6.3 X10*3/UL (ref 4.4–11.3)

## 2024-10-02 PROCEDURE — 82962 GLUCOSE BLOOD TEST: CPT | Performed by: FAMILY MEDICINE

## 2024-10-02 PROCEDURE — 1159F MED LIST DOCD IN RCRD: CPT | Performed by: FAMILY MEDICINE

## 2024-10-02 PROCEDURE — 1158F ADVNC CARE PLAN TLK DOCD: CPT | Performed by: FAMILY MEDICINE

## 2024-10-02 PROCEDURE — 80053 COMPREHEN METABOLIC PANEL: CPT

## 2024-10-02 PROCEDURE — 99214 OFFICE O/P EST MOD 30 MIN: CPT | Performed by: FAMILY MEDICINE

## 2024-10-02 PROCEDURE — 90662 IIV NO PRSV INCREASED AG IM: CPT | Performed by: FAMILY MEDICINE

## 2024-10-02 PROCEDURE — 80061 LIPID PANEL: CPT

## 2024-10-02 PROCEDURE — 84443 ASSAY THYROID STIM HORMONE: CPT

## 2024-10-02 PROCEDURE — 1123F ACP DISCUSS/DSCN MKR DOCD: CPT | Performed by: FAMILY MEDICINE

## 2024-10-02 PROCEDURE — 1036F TOBACCO NON-USER: CPT | Performed by: FAMILY MEDICINE

## 2024-10-02 PROCEDURE — 3077F SYST BP >= 140 MM HG: CPT | Performed by: FAMILY MEDICINE

## 2024-10-02 PROCEDURE — G0008 ADMIN INFLUENZA VIRUS VAC: HCPCS | Performed by: FAMILY MEDICINE

## 2024-10-02 PROCEDURE — 1160F RVW MEDS BY RX/DR IN RCRD: CPT | Performed by: FAMILY MEDICINE

## 2024-10-02 PROCEDURE — 3049F LDL-C 100-129 MG/DL: CPT | Performed by: FAMILY MEDICINE

## 2024-10-02 PROCEDURE — 3044F HG A1C LEVEL LT 7.0%: CPT | Performed by: FAMILY MEDICINE

## 2024-10-02 PROCEDURE — 85027 COMPLETE CBC AUTOMATED: CPT

## 2024-10-02 PROCEDURE — G0103 PSA SCREENING: HCPCS

## 2024-10-02 PROCEDURE — 3078F DIAST BP <80 MM HG: CPT | Performed by: FAMILY MEDICINE

## 2024-10-02 ASSESSMENT — PATIENT HEALTH QUESTIONNAIRE - PHQ9
2. FEELING DOWN, DEPRESSED OR HOPELESS: NOT AT ALL
SUM OF ALL RESPONSES TO PHQ9 QUESTIONS 1 AND 2: 0
1. LITTLE INTEREST OR PLEASURE IN DOING THINGS: NOT AT ALL

## 2024-10-02 NOTE — PROGRESS NOTES
Subjective   Rehan Holman is a 69 y.o. male who presents for Follow-up (Follow up visit for  diabetes).    HPI  : Patient is a 69 year old diabetic male, in for complete blood work and recheck on blood sugar and A1c.  Patient  is dieting and watching his weight.  He states his sugars have been improving at home and he hopes his A1c is better.  Patient has had 2 previous back operations and he states his back is stable but he has to watch what he does lifting at home.    Objective  : ROS :10 systems were reviewed and the information is included in the HPI and no additional review of systems is indicated.    Physical Exam  Vitals and nursing note reviewed.   Constitutional:       Appearance: Normal appearance. He is obese.      Comments: Patient is alert and oriented x3.   No acute distress   HENT:      Head: Normocephalic.      Right Ear: Tympanic membrane and external ear normal.      Left Ear: Tympanic membrane and external ear normal.      Ears:      Comments: Ears are patent bilaterally and TMs are clear.     Nose: Nose normal.      Mouth/Throat:      Mouth: Mucous membranes are moist.      Pharynx: Oropharynx is clear.      Comments: Mouth is moist, tongue is midline.  No posterior pharyngeal erythema.  Eyes:      Extraocular Movements: Extraocular movements intact.      Conjunctiva/sclera: Conjunctivae normal.      Pupils: Pupils are equal, round, and reactive to light.      Comments: NAION  right eye.    Legally blind right eye.    Neck:      Comments: No carotid bruits, no thyromegaly, no cervical adenopathy.  Neck spasm and restriction of motion secondary to arthritis,   stress and tension.  Cardiovascular:      Rate and Rhythm: Normal rate and regular rhythm.      Pulses: Normal pulses.      Heart sounds: Normal heart sounds.      Comments: Patient denies chest pain and no palpitations.  Heart rhythm is stable S1 and S2 are noted, no ectopics.  Pulmonary:      Effort: Pulmonary effort is normal.      Breath  sounds: Normal breath sounds.      Comments: Patient denies any coughing or wheezing.  Lungs are clear to auscultation.    Abdominal:      General: Bowel sounds are normal.      Palpations: Abdomen is soft.      Comments: Abdomen is soft and obese , but non tender.  No flank tenderness.  No suprapubic pain.  Positive bowel sounds.   No abdominal guarding and no rebound tenderness.   Genitourinary:     Comments: Patient denies dysuria, no hematuria, no nocturia, denies flank pain.  Peyronies dx.   Musculoskeletal:         General: Tenderness present. Normal range of motion.      Cervical back: Normal range of motion.      Comments: 2  previous lumbar surgeries and stable.   Chronic lumbosacral DJD  with occasional sciatica.   Skin:     General: Skin is warm.      Comments: There is no bruising, no erythema, no skin lesions noted, no rashes.   Neurological:      General: No focal deficit present.      Mental Status: He is alert and oriented to person, place, and time. Mental status is at baseline.      Sensory: Sensory deficit present.      Comments: No focal neurosensory deficits are noted.  Patient denies any peripheral neuropathy.  Coordination and gait are stable.  Normal muscle strength upper and lower extremities.   Psychiatric:         Mood and Affect: Mood normal.         Behavior: Behavior normal.         Thought Content: Thought content normal.         Judgment: Judgment normal.      Comments: Patient has anxious mood and affect. Taking Prozac and it helps.  Thought content and judgment are stable.  No signs of vascular dementia.  Behavior is normal.     PLAN : Patient is a 69-year-old diabetic male who was evaluated today for complete blood work, blood sugar, A1c and a high-dose flu shot.  Blood sugar was 99, A1c was 6.0%, and patient did receive his flu shot.  He will be notified of his other blood results in 3 days and further recommendations will be made at that time.  Patient will follow-up as needed  pending his blood work results.  He is trying to watch his diet and lose some weight and overall he is very stable at this time and was encouraged to continue with his diet.    Problem List Items Addressed This Visit       Malaise and fatigue    Relevant Orders    CBC    Comprehensive Metabolic Panel    Lipid Panel    POCT fingerstick glucose manually resulted    Thyroid Stimulating Hormone    POCT Glycosylated Hemoglobin (HGB A1C) docked device    Hypertension    Relevant Orders    CBC    Comprehensive Metabolic Panel    Lipid Panel    POCT fingerstick glucose manually resulted    Thyroid Stimulating Hormone    POCT Glycosylated Hemoglobin (HGB A1C) docked device    Dyslipidemia    Relevant Orders    CBC    Comprehensive Metabolic Panel    Lipid Panel    POCT fingerstick glucose manually resulted    Thyroid Stimulating Hormone    POCT Glycosylated Hemoglobin (HGB A1C) docked device    Diabetes mellitus without complication (Multi)    Relevant Orders    CBC    Comprehensive Metabolic Panel    Lipid Panel    POCT fingerstick glucose manually resulted    Thyroid Stimulating Hormone    POCT Glycosylated Hemoglobin (HGB A1C) docked device    Screening PSA (prostate specific antigen)    Relevant Orders    Prostate Specific Antigen, Screen            Yadiel Malone, DO

## 2024-10-04 NOTE — RESULT ENCOUNTER NOTE
Cholesterol is borderline at 221     triglycerides are borderline at 187   and should be under 150       just continue to watch the diet      thyroid function is normal     red and white blood cell counts are normal        prostate level is normal 0.59     red and white blood cell counts are normal    diabetes is stable          just try to watch the cholesterol and fats in the diet      other blood work all looks good

## 2024-10-21 DIAGNOSIS — R06.02 SHORTNESS OF BREATH: ICD-10-CM

## 2024-10-21 DIAGNOSIS — R00.2 PALPITATION: ICD-10-CM

## 2024-11-05 ENCOUNTER — APPOINTMENT (OUTPATIENT)
Dept: CARDIOLOGY | Facility: CLINIC | Age: 69
End: 2024-11-05
Payer: MEDICARE

## 2024-12-05 ENCOUNTER — APPOINTMENT (OUTPATIENT)
Dept: CARDIOLOGY | Facility: CLINIC | Age: 69
End: 2024-12-05
Payer: MEDICARE

## 2024-12-14 DIAGNOSIS — K21.00 GASTROESOPHAGEAL REFLUX DISEASE WITH ESOPHAGITIS WITHOUT HEMORRHAGE: ICD-10-CM

## 2024-12-14 DIAGNOSIS — F32.1 CURRENT MODERATE EPISODE OF MAJOR DEPRESSIVE DISORDER WITHOUT PRIOR EPISODE (MULTI): ICD-10-CM

## 2024-12-14 DIAGNOSIS — I10 PRIMARY HYPERTENSION: ICD-10-CM

## 2024-12-14 DIAGNOSIS — M54.17 LUMBOSACRAL RADICULITIS: ICD-10-CM

## 2024-12-16 RX ORDER — AMLODIPINE AND BENAZEPRIL HYDROCHLORIDE 5; 40 MG/1; MG/1
1 CAPSULE ORAL DAILY
Qty: 100 CAPSULE | Refills: 2 | Status: SHIPPED | OUTPATIENT
Start: 2024-12-16

## 2024-12-16 RX ORDER — GABAPENTIN 300 MG/1
CAPSULE ORAL
Qty: 200 CAPSULE | Refills: 2 | Status: SHIPPED | OUTPATIENT
Start: 2024-12-16

## 2024-12-16 RX ORDER — PANTOPRAZOLE SODIUM 40 MG/1
40 TABLET, DELAYED RELEASE ORAL DAILY
Qty: 100 TABLET | Refills: 2 | Status: SHIPPED | OUTPATIENT
Start: 2024-12-16

## 2024-12-16 RX ORDER — METOPROLOL SUCCINATE 50 MG/1
50 TABLET, EXTENDED RELEASE ORAL DAILY
Qty: 100 TABLET | Refills: 2 | Status: SHIPPED | OUTPATIENT
Start: 2024-12-16

## 2024-12-16 RX ORDER — FLUOXETINE HYDROCHLORIDE 40 MG/1
CAPSULE ORAL
Qty: 180 CAPSULE | Refills: 3 | Status: SHIPPED | OUTPATIENT
Start: 2024-12-16

## 2025-01-08 ENCOUNTER — APPOINTMENT (OUTPATIENT)
Dept: PRIMARY CARE | Facility: CLINIC | Age: 70
End: 2025-01-08
Payer: MEDICARE

## 2025-01-22 LAB
NON-UH HIE BUN: 23 MG/DL (ref 9–23)
NON-UH HIE CREATININE: 1 MG/DL (ref 0.6–1.1)
NON-UH HIE GFR AA: >60
NON-UH HIE GLOMERULAR FILTRATION RATE: >60 ML/MIN/1.73M?

## 2025-02-03 ENCOUNTER — APPOINTMENT (OUTPATIENT)
Dept: PRIMARY CARE | Facility: CLINIC | Age: 70
End: 2025-02-03
Payer: MEDICARE

## 2025-02-03 DIAGNOSIS — H47.011 NAION (NON-ARTERITIC ANTERIOR ISCHEMIC OPTIC NEUROPATHY), RIGHT EYE: ICD-10-CM

## 2025-02-03 DIAGNOSIS — I26.94 MULTIPLE SUBSEGMENTAL PULMONARY EMBOLI WITHOUT ACUTE COR PULMONALE: Primary | ICD-10-CM

## 2025-02-03 DIAGNOSIS — R53.81 MALAISE AND FATIGUE: ICD-10-CM

## 2025-02-03 DIAGNOSIS — R53.83 MALAISE AND FATIGUE: ICD-10-CM

## 2025-02-03 DIAGNOSIS — Z79.01 ANTICOAGULATED ON COUMADIN: ICD-10-CM

## 2025-02-03 DIAGNOSIS — H54.61 VISION LOSS OF RIGHT EYE: ICD-10-CM

## 2025-02-03 DIAGNOSIS — E11.9 DIABETES MELLITUS WITHOUT COMPLICATION (MULTI): ICD-10-CM

## 2025-02-03 DIAGNOSIS — Z98.890 STATUS POST LUMBAR SPINE SURGERY FOR DECOMPRESSION OF SPINAL CORD: ICD-10-CM

## 2025-02-03 DIAGNOSIS — E66.813 CLASS 3 SEVERE OBESITY DUE TO EXCESS CALORIES WITHOUT SERIOUS COMORBIDITY IN ADULT, UNSPECIFIED BMI: ICD-10-CM

## 2025-02-03 DIAGNOSIS — E66.01 CLASS 3 SEVERE OBESITY DUE TO EXCESS CALORIES WITHOUT SERIOUS COMORBIDITY IN ADULT, UNSPECIFIED BMI: ICD-10-CM

## 2025-02-03 LAB
APPEARANCE UR: CLEAR
BILIRUB UR QL STRIP: NEGATIVE
COLOR UR: YELLOW
GLUCOSE UR STRIP-MCNC: NEGATIVE MG/DL
HBA1C MFR BLD: 5.7 % (ref 4.2–6.5)
HGB UR QL STRIP: NEGATIVE
KETONES UR STRIP-MCNC: NEGATIVE MG/DL
LEUKOCYTE ESTERASE UR QL STRIP: ABNORMAL
NITRITE UR QL STRIP: NEGATIVE
PH UR STRIP: 6 [PH]
PROT UR STRIP-MCNC: NEGATIVE MG/DL
SP GR UR STRIP.AUTO: 1.01
UROBILINOGEN UR STRIP-ACNC: 0.2 E.U./DL

## 2025-02-03 PROCEDURE — 83036 HEMOGLOBIN GLYCOSYLATED A1C: CPT | Mod: CLIA WAIVED TEST | Performed by: FAMILY MEDICINE

## 2025-02-03 PROCEDURE — 81003 URINALYSIS AUTO W/O SCOPE: CPT | Performed by: FAMILY MEDICINE

## 2025-02-03 PROCEDURE — 1123F ACP DISCUSS/DSCN MKR DOCD: CPT | Performed by: FAMILY MEDICINE

## 2025-02-03 PROCEDURE — 1160F RVW MEDS BY RX/DR IN RCRD: CPT | Performed by: FAMILY MEDICINE

## 2025-02-03 PROCEDURE — 3044F HG A1C LEVEL LT 7.0%: CPT | Performed by: FAMILY MEDICINE

## 2025-02-03 PROCEDURE — 1159F MED LIST DOCD IN RCRD: CPT | Performed by: FAMILY MEDICINE

## 2025-02-03 PROCEDURE — 1036F TOBACCO NON-USER: CPT | Performed by: FAMILY MEDICINE

## 2025-02-03 PROCEDURE — 99214 OFFICE O/P EST MOD 30 MIN: CPT | Performed by: FAMILY MEDICINE

## 2025-02-03 ASSESSMENT — PATIENT HEALTH QUESTIONNAIRE - PHQ9
2. FEELING DOWN, DEPRESSED OR HOPELESS: NOT AT ALL
2. FEELING DOWN, DEPRESSED OR HOPELESS: NOT AT ALL
1. LITTLE INTEREST OR PLEASURE IN DOING THINGS: NOT AT ALL
SUM OF ALL RESPONSES TO PHQ9 QUESTIONS 1 AND 2: 0
1. LITTLE INTEREST OR PLEASURE IN DOING THINGS: NOT AT ALL
SUM OF ALL RESPONSES TO PHQ9 QUESTIONS 1 AND 2: 0

## 2025-02-03 NOTE — PROGRESS NOTES
Subjective   Rehan Holman is a 69 y.o. male who presents for Follow-up (Follow up visit for diabetes/).    HPI  : Patient is a 69 year old diabetic male   who was in the hospital end of October for Pulmonary Embolus.  Then he had chest pain and shortness of breath and had another CT angiogram on  January 27 th . Spread of Pulmonary  Embolus. Now on Lovenox  and Coumadin and is following with cardiology but will need to see vascular surgery for placement of a filter.  Patient has an appointment tomorrow morning and he will discuss this with cardiology.  Patient also has some bleeding gums but his INR today was only 1.1% but he also is on injectable Lovenox.    Objective   : ROS : 10 systems were reviewed and the information is included in the HPI and no additional review of systems is indicated.    Physical Exam  Vitals and nursing note reviewed.   Constitutional:       Appearance: Normal appearance. He is normal weight.      Comments: Patient is alert and oriented x3.   No acute distress   HENT:      Head: Normocephalic.      Right Ear: Tympanic membrane and external ear normal.      Left Ear: Tympanic membrane and external ear normal.      Ears:      Comments: Ears are patent bilaterally and TMs are clear.     Nose: Nose normal.      Mouth/Throat:      Mouth: Mucous membranes are moist.      Pharynx: Oropharynx is clear. Posterior oropharyngeal erythema present.      Comments: Mouth is moist, tongue is midline.  Patient has a bleeding left lower gum from where a  tooth had previously been extracted.  Eyes:      Extraocular Movements: Extraocular movements intact.      Conjunctiva/sclera: Conjunctivae normal.      Pupils: Pupils are equal, round, and reactive to light.      Comments: Patient has blindness in his right eye from  NAION>  .   Follows with a retinal specialist.   Neck:      Comments: No carotid bruits, no thyromegaly, no cervical adenopathy.  Occasional neck spasm and restriction of motion secondary to  stress and tension.  Cardiovascular:      Rate and Rhythm: Normal rate and regular rhythm.      Pulses: Normal pulses.      Heart sounds: Normal heart sounds.      Comments: When patient was in the ER he had sinus bradycardia but today his heart rhythm is stable.  He did have chest pain at the time of the pulmonary emboli.  Heart rhythm is stable S1 and S2 are noted.  Pulmonary:      Effort: Pulmonary effort is normal.      Breath sounds: Rhonchi present.      Comments: Still short of breath from Pulmonary  Embolus.  Repeat CT angiogram scan was January 27, 2025.  Lungs with scattered rhonchi.    Abdominal:      General: Bowel sounds are normal.      Palpations: Abdomen is soft.      Comments: Abdomen is soft and obese but non tender.  No flank tenderness.  No suprapubic pain.  Positive bowel sounds x4.  No abdominal guarding and no rebound tenderness.   Genitourinary:     Comments: Patient denies dysuria, no hematuria, no nocturia, denies flank pain.  Musculoskeletal:         General: Normal range of motion.      Cervical back: Normal range of motion.      Comments: Patient has had 2 previous lumbar spine surgeries and currently is stable.  Age-related arthritis in the joints.  Restriction of motion cervical and lumbar spines due to arthritis, and paraspinal muscle spasm.   Skin:     General: Skin is warm and dry.      Findings: Bruising present.      Comments: Few bruises from his blood thinner on the abdomen.  He is using injectable Lovenox.  Also has some bleeding of his gums.  This is where he had a previous tooth extracted.   Neurological:      General: No focal deficit present.      Mental Status: He is alert and oriented to person, place, and time. Mental status is at baseline.      Sensory: Sensory deficit present.      Comments: No focal neurosensory deficits are noted.  Paresthesias from lumbosacral disc disease and 2 previous lumbar surgeries.   Coordination and gait are stable.  Normal muscle strength  upper and lower extremities.   Psychiatric:         Behavior: Behavior normal.         Thought Content: Thought content normal.         Judgment: Judgment normal.      Comments: Patient has normal mood and affect.  Thought content and judgment are stable.  No signs of vascular dementia.  Behavior is normal.     PLAN : Patient is a 69-year-old male who was diagnosed with a pulmonary embolus end of [place] and subsequently the end of January had another CT angiogram and had more pulmonary emboli.  He also is diabetic and today his blood sugar was 131 and his A1c was 5.7%.  He is following with cardiology at [facility] and he has an appointment tomorrow morning.  Patient's Coumadin today was 1.1% but he is also on Lovenox and they are adjusting his Coumadin.  Patient will need to see vascular surgery and have placement of a IVC filter.  He is seeing cardiology tomorrow and he will discuss that with them tomorrow morning.  Patient will follow-up in 3 months or sooner pending all his results.  We did have a long conversation concerning his condition and I told him it is quite unusual for him to have continued blood clotting in spite of taking Eliquis which they have now discontinued.    Problem List Items Addressed This Visit       Diabetes mellitus without complication (Multi)     Other Visit Diagnoses       Anticoagulated on Coumadin                     Yadiel Malone,

## 2025-02-04 DIAGNOSIS — G62.9 NEUROPATHY: ICD-10-CM

## 2025-02-04 LAB
ALBUMIN/CREAT UR: 3 MG/G CREAT
CREAT UR-MCNC: 89 MG/DL (ref 20–320)
MICROALBUMIN UR-MCNC: 0.3 MG/DL

## 2025-02-06 DIAGNOSIS — I26.94 MULTIPLE SUBSEGMENTAL PULMONARY EMBOLI WITHOUT ACUTE COR PULMONALE: Primary | ICD-10-CM

## 2025-02-12 ENCOUNTER — HOSPITAL ENCOUNTER (INPATIENT)
Facility: HOSPITAL | Age: 70
LOS: 2 days | Discharge: HOME | DRG: 244 | End: 2025-02-15
Attending: EMERGENCY MEDICINE
Payer: MEDICARE

## 2025-02-12 ENCOUNTER — APPOINTMENT (OUTPATIENT)
Dept: VASCULAR MEDICINE | Facility: HOSPITAL | Age: 70
DRG: 244 | End: 2025-02-12
Payer: MEDICARE

## 2025-02-12 ENCOUNTER — APPOINTMENT (OUTPATIENT)
Dept: RADIOLOGY | Facility: HOSPITAL | Age: 70
DRG: 244 | End: 2025-02-12
Payer: MEDICARE

## 2025-02-12 ENCOUNTER — APPOINTMENT (OUTPATIENT)
Dept: CARDIOLOGY | Facility: HOSPITAL | Age: 70
DRG: 244 | End: 2025-02-12
Payer: MEDICARE

## 2025-02-12 DIAGNOSIS — M79.89 OTHER SPECIFIED SOFT TISSUE DISORDERS: ICD-10-CM

## 2025-02-12 DIAGNOSIS — Z86.711 HISTORY OF PULMONARY EMBOLUS (PE): ICD-10-CM

## 2025-02-12 DIAGNOSIS — I44.1 SECOND DEGREE MOBITZ II AV BLOCK: ICD-10-CM

## 2025-02-12 DIAGNOSIS — J18.1 LUNG CONSOLIDATION (CMS-HCC): Primary | ICD-10-CM

## 2025-02-12 DIAGNOSIS — R00.1 SINUS BRADYCARDIA: ICD-10-CM

## 2025-02-12 DIAGNOSIS — R07.89 ACUTE CHEST WALL PAIN: ICD-10-CM

## 2025-02-12 DIAGNOSIS — I45.5 OTHER SPECIFIED HEART BLOCK: ICD-10-CM

## 2025-02-12 DIAGNOSIS — I44.1 AV BLOCK, MOBITZ II: ICD-10-CM

## 2025-02-12 DIAGNOSIS — I44.2 COMPLETE HEART BLOCK: ICD-10-CM

## 2025-02-12 LAB
ALBUMIN SERPL BCP-MCNC: 4.2 G/DL (ref 3.4–5)
ALP SERPL-CCNC: 57 U/L (ref 33–136)
ALT SERPL W P-5'-P-CCNC: 30 U/L (ref 10–52)
ANION GAP SERPL CALC-SCNC: 12 MMOL/L (ref 10–20)
APTT PPP: 48 SECONDS (ref 27–38)
AST SERPL W P-5'-P-CCNC: 19 U/L (ref 9–39)
BASOPHILS # BLD AUTO: 0.03 X10*3/UL (ref 0–0.1)
BASOPHILS NFR BLD AUTO: 0.4 %
BILIRUB SERPL-MCNC: 0.7 MG/DL (ref 0–1.2)
BNP SERPL-MCNC: 287 PG/ML (ref 0–99)
BUN SERPL-MCNC: 13 MG/DL (ref 6–23)
CALCIUM SERPL-MCNC: 9 MG/DL (ref 8.6–10.3)
CARDIAC TROPONIN I PNL SERPL HS: 8 NG/L (ref 0–20)
CARDIAC TROPONIN I PNL SERPL HS: 9 NG/L (ref 0–20)
CHLORIDE SERPL-SCNC: 106 MMOL/L (ref 98–107)
CO2 SERPL-SCNC: 25 MMOL/L (ref 21–32)
CREAT SERPL-MCNC: 1.02 MG/DL (ref 0.5–1.3)
EGFRCR SERPLBLD CKD-EPI 2021: 80 ML/MIN/1.73M*2
EOSINOPHIL # BLD AUTO: 0.11 X10*3/UL (ref 0–0.7)
EOSINOPHIL NFR BLD AUTO: 1.3 %
ERYTHROCYTE [DISTWIDTH] IN BLOOD BY AUTOMATED COUNT: 13.2 % (ref 11.5–14.5)
GLUCOSE SERPL-MCNC: 132 MG/DL (ref 74–99)
HCT VFR BLD AUTO: 42 % (ref 41–52)
HGB BLD-MCNC: 14 G/DL (ref 13.5–17.5)
IMM GRANULOCYTES # BLD AUTO: 0.04 X10*3/UL (ref 0–0.7)
IMM GRANULOCYTES NFR BLD AUTO: 0.5 % (ref 0–0.9)
INR PPP: 2.7 (ref 0.9–1.1)
LYMPHOCYTES # BLD AUTO: 2.12 X10*3/UL (ref 1.2–4.8)
LYMPHOCYTES NFR BLD AUTO: 25.9 %
MAGNESIUM SERPL-MCNC: 1.84 MG/DL (ref 1.6–2.4)
MCH RBC QN AUTO: 30.8 PG (ref 26–34)
MCHC RBC AUTO-ENTMCNC: 33.3 G/DL (ref 32–36)
MCV RBC AUTO: 92 FL (ref 80–100)
MONOCYTES # BLD AUTO: 1.06 X10*3/UL (ref 0.1–1)
MONOCYTES NFR BLD AUTO: 12.9 %
NEUTROPHILS # BLD AUTO: 4.83 X10*3/UL (ref 1.2–7.7)
NEUTROPHILS NFR BLD AUTO: 59 %
NRBC BLD-RTO: 0 /100 WBCS (ref 0–0)
PLATELET # BLD AUTO: 247 X10*3/UL (ref 150–450)
POTASSIUM SERPL-SCNC: 3.9 MMOL/L (ref 3.5–5.3)
PROT SERPL-MCNC: 7.3 G/DL (ref 6.4–8.2)
PROTHROMBIN TIME: 30.2 SECONDS (ref 9.8–12.8)
RBC # BLD AUTO: 4.55 X10*6/UL (ref 4.5–5.9)
SODIUM SERPL-SCNC: 139 MMOL/L (ref 136–145)
WBC # BLD AUTO: 8.2 X10*3/UL (ref 4.4–11.3)

## 2025-02-12 PROCEDURE — 2500000004 HC RX 250 GENERAL PHARMACY W/ HCPCS (ALT 636 FOR OP/ED): Performed by: EMERGENCY MEDICINE

## 2025-02-12 PROCEDURE — 36415 COLL VENOUS BLD VENIPUNCTURE: CPT | Performed by: EMERGENCY MEDICINE

## 2025-02-12 PROCEDURE — 84484 ASSAY OF TROPONIN QUANT: CPT | Performed by: EMERGENCY MEDICINE

## 2025-02-12 PROCEDURE — 71275 CT ANGIOGRAPHY CHEST: CPT | Mod: FOREIGN READ | Performed by: RADIOLOGY

## 2025-02-12 PROCEDURE — 93970 EXTREMITY STUDY: CPT | Performed by: INTERNAL MEDICINE

## 2025-02-12 PROCEDURE — 80053 COMPREHEN METABOLIC PANEL: CPT | Performed by: EMERGENCY MEDICINE

## 2025-02-12 PROCEDURE — 85610 PROTHROMBIN TIME: CPT | Performed by: EMERGENCY MEDICINE

## 2025-02-12 PROCEDURE — 71045 X-RAY EXAM CHEST 1 VIEW: CPT | Performed by: RADIOLOGY

## 2025-02-12 PROCEDURE — 2550000001 HC RX 255 CONTRASTS: Performed by: EMERGENCY MEDICINE

## 2025-02-12 PROCEDURE — 85730 THROMBOPLASTIN TIME PARTIAL: CPT | Performed by: EMERGENCY MEDICINE

## 2025-02-12 PROCEDURE — 96365 THER/PROPH/DIAG IV INF INIT: CPT

## 2025-02-12 PROCEDURE — 71045 X-RAY EXAM CHEST 1 VIEW: CPT

## 2025-02-12 PROCEDURE — 83880 ASSAY OF NATRIURETIC PEPTIDE: CPT | Performed by: EMERGENCY MEDICINE

## 2025-02-12 PROCEDURE — 71275 CT ANGIOGRAPHY CHEST: CPT

## 2025-02-12 PROCEDURE — 99285 EMERGENCY DEPT VISIT HI MDM: CPT | Mod: 25 | Performed by: EMERGENCY MEDICINE

## 2025-02-12 PROCEDURE — 93970 EXTREMITY STUDY: CPT

## 2025-02-12 PROCEDURE — 83735 ASSAY OF MAGNESIUM: CPT | Performed by: EMERGENCY MEDICINE

## 2025-02-12 PROCEDURE — 93005 ELECTROCARDIOGRAM TRACING: CPT

## 2025-02-12 PROCEDURE — 85025 COMPLETE CBC W/AUTO DIFF WBC: CPT | Performed by: EMERGENCY MEDICINE

## 2025-02-12 RX ADMIN — PIPERACILLIN SODIUM AND TAZOBACTAM SODIUM 3.38 G: 3; .375 INJECTION, SOLUTION INTRAVENOUS at 21:29

## 2025-02-12 RX ADMIN — IOHEXOL 75 ML: 350 INJECTION, SOLUTION INTRAVENOUS at 19:41

## 2025-02-12 ASSESSMENT — PAIN SCALES - GENERAL
PAINLEVEL_OUTOF10: 0 - NO PAIN
PAINLEVEL_OUTOF10: 0 - NO PAIN

## 2025-02-12 ASSESSMENT — COLUMBIA-SUICIDE SEVERITY RATING SCALE - C-SSRS
2. HAVE YOU ACTUALLY HAD ANY THOUGHTS OF KILLING YOURSELF?: NO
6. HAVE YOU EVER DONE ANYTHING, STARTED TO DO ANYTHING, OR PREPARED TO DO ANYTHING TO END YOUR LIFE?: NO
1. IN THE PAST MONTH, HAVE YOU WISHED YOU WERE DEAD OR WISHED YOU COULD GO TO SLEEP AND NOT WAKE UP?: NO

## 2025-02-12 ASSESSMENT — PAIN - FUNCTIONAL ASSESSMENT: PAIN_FUNCTIONAL_ASSESSMENT: 0-10

## 2025-02-12 NOTE — ED NOTES
Pt recently diagnosed with a PE, states that he has been taking his lovenox shot as scheduled. Pt states that his SOB has been the same and has not increased since. States that he has a hard time doing things for a long period of time without having difficulty breathing. Endorses chest pain to the left side of his chest.      Isabel Kee RN  02/12/25 0179

## 2025-02-12 NOTE — Clinical Note
The PACEMAKER, DUAL CHAMBER, ROSALIE MRI XT DR - [id number] - [id number] device was inserted. The leads were placed into the connector and visually verified to be in correct position. with TYRX

## 2025-02-12 NOTE — ED PROVIDER NOTES
HPI   Chief Complaint   Patient presents with    Shortness of Breath     Pt recently diagnosed with a PE, states that he has been taking his lovenox shot as scheduled. Pt states that his SOB has been the same and has not increased since. States that he has a hard time doing things for a long period of time without having difficulty breathing. Endorses chest pain to the left side of his chest.          This is a 69-year-old male presenting with dyspnea.  Patient states that in October 24 of last year he was diagnosed with right-sided PEs at [facility].  He states he was admitted initially with a slow heart rate and while he was there they obtained a CT angiogram which showed PEs.  He was initially started on Eliquis.  On January 28 he was feeling short of breath and went to [facility] where he had a CTA performed which showed bilateral PEs.  He is now on weight-based Lovenox twice a day and is currently on Coumadin 12.5 mg daily and his INR has only been 1.9 at its highest.  He has been on these meds for 2 weeks.  Patient states he has been seen by hematology at [facility].  He is not sure if they found a source for his blood clots.  They wanted to do ultrasounds of the lower extremities to see if there are any DVTs they were causing them.  He states he is a scheduled but he cannot do this immediately.  He is having left-sided upper chest pain which is achy in nature.  He has been short of breath for the last couple of weeks.  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  He has been able to ambulate but slowly.  He denies any black or bloody stools.  He denies fever or chills.              Patient History   Past Medical History:   Diagnosis Date    Carpal tunnel syndrome, right upper limb 06/28/2018    Carpal tunnel syndrome of right wrist    Left lower quadrant pain     Abdominal pain, LLQ (left lower quadrant)    Legal blindness, as defined in [place]      Legal blindness    Neuralgia and neuritis, unspecified     Neuritis    Obstructive sleep apnea (adult) (pediatric)     GUALBERTO on CPAP    Other vascular myelopathies     Neurogenic claudication    Pain in unspecified lower leg     Calf pain    Personal history of other diseases of male genital organs 05/20/2019    History of erectile dysfunction    Personal history of other diseases of the nervous system and sense organs     Personal history of otitis media    Personal history of other diseases of the nervous system and sense organs     History of sleep apnea    Personal history of other diseases of the respiratory system     Personal history of sinusitis    Personal history of other diseases of the respiratory system 03/16/2015    History of bronchitis    Personal history of other endocrine, nutritional and metabolic disease     History of hypercholesterolemia    Personal history of other endocrine, nutritional and metabolic disease     History of diabetes mellitus    Personal history of other infectious and parasitic diseases 08/13/2015    History of Lyme disease    Personal history of other mental and behavioral disorders     History of anxiety disorder    Personal history of other mental and behavioral disorders     History of depression    Personal history of other specified conditions     History of exertional chest pain    Personal history of transient ischemic attack (TIA), and cerebral infarction without residual deficits     History of stroke    Sciatica, unspecified side     Sciatic nerve pain    Unqualified visual loss, right eye, normal vision left eye 09/19/2022    Vision loss of right eye    Unspecified disorder of synovium and tendon, unspecified shoulder     Rotator cuff dysfunction    Unspecified papilledema 10/06/2015    Optic disc edema     Past Surgical History:   Procedure Laterality Date    BACK SURGERY  06/03/2015    Back Surgery    ROTATOR CUFF REPAIR  11/05/2014    Rotator Cuff Repair      Family History   Problem Relation Name Age of Onset    Other (cardiac disorder) Father      Leukemia Father      Other (central retinal artery occlusion) Sister       Social History     Tobacco Use    Smoking status: Never     Passive exposure: Never    Smokeless tobacco: Never   Vaping Use    Vaping status: Never Used   Substance Use Topics    Alcohol use: Not Currently     Comment: no alcohol in 8 years    Drug use: Never       Physical Exam   ED Triage Vitals [02/12/25 1609]   Temperature Heart Rate Respirations BP   36.2 °C (97.2 °F) (!) 38 (!) 21 177/72      Pulse Ox Temp Source Heart Rate Source Patient Position   98 % Temporal Monitor Sitting      BP Location FiO2 (%)     Right arm --       Physical Exam  Vitals and nursing note reviewed.   Constitutional:       General: He is not in acute distress.     Appearance: He is well-developed.   HENT:      Head: Normocephalic and atraumatic.   Eyes:      Conjunctiva/sclera: Conjunctivae normal.   Cardiovascular:      Rate and Rhythm: Regular rhythm. Bradycardia present.      Heart sounds: No murmur heard.  Pulmonary:      Effort: Pulmonary effort is normal. No respiratory distress.      Breath sounds: Normal breath sounds.   Abdominal:      Palpations: Abdomen is soft.      Tenderness: There is no abdominal tenderness.   Musculoskeletal:         General: No swelling.      Cervical back: Neck supple.   Skin:     General: Skin is warm and dry.      Capillary Refill: Capillary refill takes less than 2 seconds.   Neurological:      Mental Status: He is alert.   Psychiatric:         Mood and Affect: Mood normal.           ED Course & MDM   Diagnoses as of 02/12/25 2302   Lung consolidation (CMS-HCC)   Complete heart block   Sinus bradycardia   History of pulmonary embolus (PE)                 No data recorded     Yesica Coma Scale Score: 15 (02/12/25 1637 : Chandni Chicas RN)                           Medical Decision Making  Patient with history of  bilateral PEs, shortness of breath.  He is currently on Lovenox and Coumadin daily and his INR has been subtherapeutic.  He states he will be able to ambulate short distances.  He does not know the source of his PEs.  He does not have any risk factors that he is aware of.  Insert chest pain work.  EKG interpreted by myself shows a complete heart block.  This was discussed with cardiology and plan is to admit the patient for this.  He is not on any medications that would cause a heart block.  He is currently hemodynamically stable.  CBC will be obtained to assess white blood cell count, hemoglobin and platelet.  CMP to assess liver function, renal function, electrolytes, glucose.  High-sensitivity troponin and EKG to assess for ischemia/dysrhythmia.  Chest x-ray to rule out pneumonia.  BNP to rule out CHF.  Will obtain a CTA of the chest.  Magnesium level be obtained.  CBC shows a normal white blood cell count of 8.2.  Hemoglobin 14.  Platelets are normal at 247.  Renal function and electrolytes are unremarkable.  INR today is 2.7.  PTT 48.  Initial high-sensitivity troponin is 9.  Delta troponin is also 9.  Chest x-ray interpreted by myself shows no acute cardiopulmonary process.  Radiologist interprets this and agrees.  BNP slightly elevated 287.  Bilateral lower extremity duplexes were both negative.  CTA of the chest was negative for PE.  Patient will be admitted for heart block.  He is admitted in stable condition.        Procedure  Procedures     Austin Sparrow,   02/12/25 2929

## 2025-02-13 ENCOUNTER — APPOINTMENT (OUTPATIENT)
Dept: CARDIOLOGY | Facility: HOSPITAL | Age: 70
DRG: 244 | End: 2025-02-13
Payer: MEDICARE

## 2025-02-13 PROBLEM — J18.1 LUNG CONSOLIDATION (CMS-HCC): Status: ACTIVE | Noted: 2025-02-13

## 2025-02-13 LAB
AORTIC VALVE MEAN GRADIENT: 7 MMHG
AORTIC VALVE PEAK VELOCITY: 1.98 M/S
AV PEAK GRADIENT: 16 MMHG
AVA (PEAK VEL): 1.8 CM2
AVA (VTI): 1.91 CM2
EJECTION FRACTION APICAL 4 CHAMBER: 74.3
EJECTION FRACTION: 58 %
GLUCOSE BLD MANUAL STRIP-MCNC: 111 MG/DL (ref 74–99)
GLUCOSE BLD MANUAL STRIP-MCNC: 136 MG/DL (ref 74–99)
GLUCOSE BLD MANUAL STRIP-MCNC: 170 MG/DL (ref 74–99)
GLUCOSE BLD MANUAL STRIP-MCNC: 95 MG/DL (ref 74–99)
INR PPP: 2.3 (ref 0.9–1.1)
INR PPP: 2.4 (ref 0.9–1.1)
LEFT ATRIUM VOLUME AREA LENGTH INDEX BSA: 37.9 ML/M2
LEFT VENTRICLE INTERNAL DIMENSION DIASTOLE: 5.94 CM (ref 3.5–6)
LEFT VENTRICULAR OUTFLOW TRACT DIAMETER: 2.1 CM
PROCALCITONIN SERPL-MCNC: 0.05 NG/ML
PROTHROMBIN TIME: 26.2 SECONDS (ref 9.8–12.8)
PROTHROMBIN TIME: 27.7 SECONDS (ref 9.8–12.8)
RIGHT VENTRICLE FREE WALL PEAK S': 18.5 CM/S
TRICUSPID ANNULAR PLANE SYSTOLIC EXCURSION: 3.4 CM

## 2025-02-13 PROCEDURE — 99223 1ST HOSP IP/OBS HIGH 75: CPT

## 2025-02-13 PROCEDURE — 84145 PROCALCITONIN (PCT): CPT | Mod: PARLAB

## 2025-02-13 PROCEDURE — 2500000001 HC RX 250 WO HCPCS SELF ADMINISTERED DRUGS (ALT 637 FOR MEDICARE OP): Performed by: NURSE PRACTITIONER

## 2025-02-13 PROCEDURE — 85610 PROTHROMBIN TIME: CPT | Performed by: NURSE PRACTITIONER

## 2025-02-13 PROCEDURE — 2500000004 HC RX 250 GENERAL PHARMACY W/ HCPCS (ALT 636 FOR OP/ED)

## 2025-02-13 PROCEDURE — 99223 1ST HOSP IP/OBS HIGH 75: CPT | Performed by: STUDENT IN AN ORGANIZED HEALTH CARE EDUCATION/TRAINING PROGRAM

## 2025-02-13 PROCEDURE — 2060000001 HC INTERMEDIATE ICU ROOM DAILY

## 2025-02-13 PROCEDURE — 02H63JZ INSERTION OF PACEMAKER LEAD INTO RIGHT ATRIUM, PERCUTANEOUS APPROACH: ICD-10-PCS | Performed by: STUDENT IN AN ORGANIZED HEALTH CARE EDUCATION/TRAINING PROGRAM

## 2025-02-13 PROCEDURE — 2500000002 HC RX 250 W HCPCS SELF ADMINISTERED DRUGS (ALT 637 FOR MEDICARE OP, ALT 636 FOR OP/ED): Performed by: NURSE PRACTITIONER

## 2025-02-13 PROCEDURE — 36415 COLL VENOUS BLD VENIPUNCTURE: CPT | Performed by: INTERNAL MEDICINE

## 2025-02-13 PROCEDURE — 2500000001 HC RX 250 WO HCPCS SELF ADMINISTERED DRUGS (ALT 637 FOR MEDICARE OP)

## 2025-02-13 PROCEDURE — 0JH606Z INSERTION OF PACEMAKER, DUAL CHAMBER INTO CHEST SUBCUTANEOUS TISSUE AND FASCIA, OPEN APPROACH: ICD-10-PCS | Performed by: STUDENT IN AN ORGANIZED HEALTH CARE EDUCATION/TRAINING PROGRAM

## 2025-02-13 PROCEDURE — 94660 CPAP INITIATION&MGMT: CPT

## 2025-02-13 PROCEDURE — 82947 ASSAY GLUCOSE BLOOD QUANT: CPT

## 2025-02-13 PROCEDURE — 36415 COLL VENOUS BLD VENIPUNCTURE: CPT

## 2025-02-13 PROCEDURE — 02HK3JZ INSERTION OF PACEMAKER LEAD INTO RIGHT VENTRICLE, PERCUTANEOUS APPROACH: ICD-10-PCS | Performed by: STUDENT IN AN ORGANIZED HEALTH CARE EDUCATION/TRAINING PROGRAM

## 2025-02-13 PROCEDURE — 85610 PROTHROMBIN TIME: CPT | Performed by: INTERNAL MEDICINE

## 2025-02-13 PROCEDURE — 93306 TTE W/DOPPLER COMPLETE: CPT

## 2025-02-13 RX ORDER — PANTOPRAZOLE SODIUM 40 MG/1
40 TABLET, DELAYED RELEASE ORAL DAILY
Status: DISCONTINUED | OUTPATIENT
Start: 2025-02-13 | End: 2025-02-15 | Stop reason: HOSPADM

## 2025-02-13 RX ORDER — ASPIRIN 81 MG/1
81 TABLET ORAL DAILY
Status: DISCONTINUED | OUTPATIENT
Start: 2025-02-13 | End: 2025-02-15 | Stop reason: HOSPADM

## 2025-02-13 RX ORDER — HYDRALAZINE HYDROCHLORIDE 20 MG/ML
5 INJECTION INTRAMUSCULAR; INTRAVENOUS ONCE
Status: COMPLETED | OUTPATIENT
Start: 2025-02-13 | End: 2025-02-13

## 2025-02-13 RX ORDER — METOPROLOL SUCCINATE 50 MG/1
50 TABLET, EXTENDED RELEASE ORAL DAILY
Status: DISCONTINUED | OUTPATIENT
Start: 2025-02-13 | End: 2025-02-15 | Stop reason: HOSPADM

## 2025-02-13 RX ORDER — ACETAMINOPHEN 160 MG/5ML
650 SOLUTION ORAL EVERY 4 HOURS PRN
Status: CANCELLED | OUTPATIENT
Start: 2025-02-13

## 2025-02-13 RX ORDER — INSULIN LISPRO 100 [IU]/ML
0-10 INJECTION, SOLUTION INTRAVENOUS; SUBCUTANEOUS
Status: DISCONTINUED | OUTPATIENT
Start: 2025-02-13 | End: 2025-02-15 | Stop reason: HOSPADM

## 2025-02-13 RX ORDER — ACETAMINOPHEN 160 MG/5ML
650 SOLUTION ORAL EVERY 4 HOURS PRN
Status: DISCONTINUED | OUTPATIENT
Start: 2025-02-13 | End: 2025-02-15 | Stop reason: HOSPADM

## 2025-02-13 RX ORDER — ACETAMINOPHEN 650 MG/1
650 SUPPOSITORY RECTAL EVERY 4 HOURS PRN
Status: DISCONTINUED | OUTPATIENT
Start: 2025-02-13 | End: 2025-02-15 | Stop reason: HOSPADM

## 2025-02-13 RX ORDER — DEXTROSE 50 % IN WATER (D50W) INTRAVENOUS SYRINGE
25
Status: DISCONTINUED | OUTPATIENT
Start: 2025-02-13 | End: 2025-02-15 | Stop reason: HOSPADM

## 2025-02-13 RX ORDER — FLUOXETINE HYDROCHLORIDE 20 MG/1
80 CAPSULE ORAL DAILY
Status: DISCONTINUED | OUTPATIENT
Start: 2025-02-13 | End: 2025-02-15 | Stop reason: HOSPADM

## 2025-02-13 RX ORDER — WARFARIN SODIUM 5 MG/1
10 TABLET ORAL DAILY
Status: DISCONTINUED | OUTPATIENT
Start: 2025-02-15 | End: 2025-02-15 | Stop reason: HOSPADM

## 2025-02-13 RX ORDER — GABAPENTIN 300 MG/1
600 CAPSULE ORAL EVERY MORNING
Status: DISCONTINUED | OUTPATIENT
Start: 2025-02-14 | End: 2025-02-15 | Stop reason: HOSPADM

## 2025-02-13 RX ORDER — VIT C/E/ZN/COPPR/LUTEIN/ZEAXAN 250MG-90MG
25 CAPSULE ORAL DAILY
COMMUNITY

## 2025-02-13 RX ORDER — WARFARIN SODIUM 5 MG/1
10 TABLET ORAL ONCE
Status: DISCONTINUED | OUTPATIENT
Start: 2025-02-14 | End: 2025-02-15 | Stop reason: HOSPADM

## 2025-02-13 RX ORDER — ACETAMINOPHEN 325 MG/1
650 TABLET ORAL EVERY 4 HOURS PRN
Status: CANCELLED | OUTPATIENT
Start: 2025-02-13

## 2025-02-13 RX ORDER — SIMVASTATIN 10 MG/1
10 TABLET, FILM COATED ORAL DAILY
Status: DISCONTINUED | OUTPATIENT
Start: 2025-02-13 | End: 2025-02-15 | Stop reason: HOSPADM

## 2025-02-13 RX ORDER — WARFARIN SODIUM 5 MG/1
5 TABLET ORAL SEE ADMIN INSTRUCTIONS
Status: ON HOLD | COMMUNITY
End: 2025-02-15

## 2025-02-13 RX ORDER — ACETAMINOPHEN 325 MG/1
650 TABLET ORAL EVERY 4 HOURS PRN
Status: DISCONTINUED | OUTPATIENT
Start: 2025-02-13 | End: 2025-02-15 | Stop reason: HOSPADM

## 2025-02-13 RX ORDER — ACETAMINOPHEN 650 MG/1
650 SUPPOSITORY RECTAL EVERY 4 HOURS PRN
Status: CANCELLED | OUTPATIENT
Start: 2025-02-13

## 2025-02-13 RX ORDER — DEXTROSE 50 % IN WATER (D50W) INTRAVENOUS SYRINGE
12.5
Status: DISCONTINUED | OUTPATIENT
Start: 2025-02-13 | End: 2025-02-15 | Stop reason: HOSPADM

## 2025-02-13 RX ORDER — LISINOPRIL 40 MG/1
40 TABLET ORAL DAILY
Status: DISCONTINUED | OUTPATIENT
Start: 2025-02-13 | End: 2025-02-15 | Stop reason: HOSPADM

## 2025-02-13 RX ORDER — CHOLECALCIFEROL (VITAMIN D3) 25 MCG
2000 TABLET ORAL DAILY
Status: DISCONTINUED | OUTPATIENT
Start: 2025-02-13 | End: 2025-02-15 | Stop reason: HOSPADM

## 2025-02-13 RX ADMIN — LISINOPRIL 40 MG: 40 TABLET ORAL at 08:55

## 2025-02-13 RX ADMIN — WARFARIN SODIUM 7.5 MG: 5 TABLET ORAL at 21:18

## 2025-02-13 RX ADMIN — HYDRALAZINE HYDROCHLORIDE 5 MG: 20 INJECTION INTRAMUSCULAR; INTRAVENOUS at 01:48

## 2025-02-13 RX ADMIN — ASPIRIN 81 MG: 81 TABLET, COATED ORAL at 21:08

## 2025-02-13 RX ADMIN — SIMVASTATIN 10 MG: 10 TABLET, FILM COATED ORAL at 21:19

## 2025-02-13 RX ADMIN — Medication 2000 UNITS: at 21:08

## 2025-02-13 RX ADMIN — FLUOXETINE HYDROCHLORIDE 80 MG: 20 CAPSULE ORAL at 21:08

## 2025-02-13 RX ADMIN — PANTOPRAZOLE SODIUM 40 MG: 40 TABLET, DELAYED RELEASE ORAL at 21:08

## 2025-02-13 SDOH — ECONOMIC STABILITY: INCOME INSECURITY: IN THE PAST 12 MONTHS HAS THE ELECTRIC, GAS, OIL, OR WATER COMPANY THREATENED TO SHUT OFF SERVICES IN YOUR HOME?: NO

## 2025-02-13 SDOH — ECONOMIC STABILITY: FOOD INSECURITY: WITHIN THE PAST 12 MONTHS, YOU WORRIED THAT YOUR FOOD WOULD RUN OUT BEFORE YOU GOT THE MONEY TO BUY MORE.: NEVER TRUE

## 2025-02-13 SDOH — SOCIAL STABILITY: SOCIAL INSECURITY
WITHIN THE LAST YEAR, HAVE YOU BEEN KICKED, HIT, SLAPPED, OR OTHERWISE PHYSICALLY HURT BY YOUR PARTNER OR EX-PARTNER?: NO

## 2025-02-13 SDOH — ECONOMIC STABILITY: HOUSING INSECURITY: AT ANY TIME IN THE PAST 12 MONTHS, WERE YOU HOMELESS OR LIVING IN A SHELTER (INCLUDING NOW)?: NO

## 2025-02-13 SDOH — SOCIAL STABILITY: SOCIAL INSECURITY: WITHIN THE LAST YEAR, HAVE YOU BEEN HUMILIATED OR EMOTIONALLY ABUSED IN OTHER WAYS BY YOUR PARTNER OR EX-PARTNER?: NO

## 2025-02-13 SDOH — SOCIAL STABILITY: SOCIAL INSECURITY: DO YOU FEEL ANYONE HAS EXPLOITED OR TAKEN ADVANTAGE OF YOU FINANCIALLY OR OF YOUR PERSONAL PROPERTY?: NO

## 2025-02-13 SDOH — SOCIAL STABILITY: SOCIAL INSECURITY: HAVE YOU HAD ANY THOUGHTS OF HARMING ANYONE ELSE?: NO

## 2025-02-13 SDOH — SOCIAL STABILITY: SOCIAL INSECURITY: HAS ANYONE EVER THREATENED TO HURT YOUR FAMILY OR YOUR PETS?: NO

## 2025-02-13 SDOH — ECONOMIC STABILITY: TRANSPORTATION INSECURITY: IN THE PAST 12 MONTHS, HAS LACK OF TRANSPORTATION KEPT YOU FROM MEDICAL APPOINTMENTS OR FROM GETTING MEDICATIONS?: NO

## 2025-02-13 SDOH — SOCIAL STABILITY: SOCIAL INSECURITY: WITHIN THE LAST YEAR, HAVE YOU BEEN AFRAID OF YOUR PARTNER OR EX-PARTNER?: NO

## 2025-02-13 SDOH — ECONOMIC STABILITY: FOOD INSECURITY: HOW HARD IS IT FOR YOU TO PAY FOR THE VERY BASICS LIKE FOOD, HOUSING, MEDICAL CARE, AND HEATING?: NOT HARD AT ALL

## 2025-02-13 SDOH — ECONOMIC STABILITY: HOUSING INSECURITY: IN THE LAST 12 MONTHS, WAS THERE A TIME WHEN YOU WERE NOT ABLE TO PAY THE MORTGAGE OR RENT ON TIME?: NO

## 2025-02-13 SDOH — SOCIAL STABILITY: SOCIAL INSECURITY: DO YOU FEEL UNSAFE GOING BACK TO THE PLACE WHERE YOU ARE LIVING?: NO

## 2025-02-13 SDOH — ECONOMIC STABILITY: FOOD INSECURITY: WITHIN THE PAST 12 MONTHS, THE FOOD YOU BOUGHT JUST DIDN'T LAST AND YOU DIDN'T HAVE MONEY TO GET MORE.: NEVER TRUE

## 2025-02-13 SDOH — SOCIAL STABILITY: SOCIAL INSECURITY
WITHIN THE LAST YEAR, HAVE YOU BEEN RAPED OR FORCED TO HAVE ANY KIND OF SEXUAL ACTIVITY BY YOUR PARTNER OR EX-PARTNER?: NO

## 2025-02-13 SDOH — SOCIAL STABILITY: SOCIAL INSECURITY: HAVE YOU HAD THOUGHTS OF HARMING ANYONE ELSE?: NO

## 2025-02-13 SDOH — SOCIAL STABILITY: SOCIAL INSECURITY: ABUSE: ADULT

## 2025-02-13 SDOH — SOCIAL STABILITY: SOCIAL INSECURITY: ARE YOU OR HAVE YOU BEEN THREATENED OR ABUSED PHYSICALLY, EMOTIONALLY, OR SEXUALLY BY ANYONE?: NO

## 2025-02-13 SDOH — SOCIAL STABILITY: SOCIAL INSECURITY: ARE THERE ANY APPARENT SIGNS OF INJURIES/BEHAVIORS THAT COULD BE RELATED TO ABUSE/NEGLECT?: NO

## 2025-02-13 SDOH — ECONOMIC STABILITY: HOUSING INSECURITY: IN THE PAST 12 MONTHS, HOW MANY TIMES HAVE YOU MOVED WHERE YOU WERE LIVING?: 1

## 2025-02-13 SDOH — SOCIAL STABILITY: SOCIAL INSECURITY: WERE YOU ABLE TO COMPLETE ALL THE BEHAVIORAL HEALTH SCREENINGS?: YES

## 2025-02-13 SDOH — SOCIAL STABILITY: SOCIAL INSECURITY: DOES ANYONE TRY TO KEEP YOU FROM HAVING/CONTACTING OTHER FRIENDS OR DOING THINGS OUTSIDE YOUR HOME?: NO

## 2025-02-13 ASSESSMENT — ACTIVITIES OF DAILY LIVING (ADL)
HEARING - RIGHT EAR: FUNCTIONAL
LACK_OF_TRANSPORTATION: NO
GROOMING: INDEPENDENT
JUDGMENT_ADEQUATE_SAFELY_COMPLETE_DAILY_ACTIVITIES: YES
BATHING: INDEPENDENT
ADEQUATE_TO_COMPLETE_ADL: YES
PATIENT'S MEMORY ADEQUATE TO SAFELY COMPLETE DAILY ACTIVITIES?: YES
DRESSING YOURSELF: INDEPENDENT
WALKS IN HOME: NEEDS ASSISTANCE
TOILETING: NEEDS ASSISTANCE
HEARING - LEFT EAR: FUNCTIONAL
FEEDING YOURSELF: INDEPENDENT
LACK_OF_TRANSPORTATION: NO

## 2025-02-13 ASSESSMENT — LIFESTYLE VARIABLES
HOW MANY STANDARD DRINKS CONTAINING ALCOHOL DO YOU HAVE ON A TYPICAL DAY: PATIENT DOES NOT DRINK
PRESCIPTION_ABUSE_PAST_12_MONTHS: NO
HOW OFTEN DO YOU HAVE A DRINK CONTAINING ALCOHOL: NEVER
SUBSTANCE_ABUSE_PAST_12_MONTHS: NO
SKIP TO QUESTIONS 9-10: 1
AUDIT-C TOTAL SCORE: 0
HOW OFTEN DO YOU HAVE 6 OR MORE DRINKS ON ONE OCCASION: NEVER
AUDIT-C TOTAL SCORE: 0

## 2025-02-13 ASSESSMENT — COGNITIVE AND FUNCTIONAL STATUS - GENERAL
PATIENT BASELINE BEDBOUND: NO
MOVING TO AND FROM BED TO CHAIR: A LITTLE
STANDING UP FROM CHAIR USING ARMS: A LITTLE
HELP NEEDED FOR BATHING: A LITTLE
CLIMB 3 TO 5 STEPS WITH RAILING: A LITTLE
MOBILITY SCORE: 20
TOILETING: A LITTLE
WALKING IN HOSPITAL ROOM: A LITTLE
DAILY ACTIVITIY SCORE: 22

## 2025-02-13 ASSESSMENT — COLUMBIA-SUICIDE SEVERITY RATING SCALE - C-SSRS
6. HAVE YOU EVER DONE ANYTHING, STARTED TO DO ANYTHING, OR PREPARED TO DO ANYTHING TO END YOUR LIFE?: NO
2. HAVE YOU ACTUALLY HAD ANY THOUGHTS OF KILLING YOURSELF?: NO
1. IN THE PAST MONTH, HAVE YOU WISHED YOU WERE DEAD OR WISHED YOU COULD GO TO SLEEP AND NOT WAKE UP?: NO

## 2025-02-13 ASSESSMENT — PAIN SCALES - GENERAL
PAINLEVEL_OUTOF10: 3
PAINLEVEL_OUTOF10: 0 - NO PAIN

## 2025-02-13 ASSESSMENT — PATIENT HEALTH QUESTIONNAIRE - PHQ9
2. FEELING DOWN, DEPRESSED OR HOPELESS: NOT AT ALL
SUM OF ALL RESPONSES TO PHQ9 QUESTIONS 1 & 2: 0
1. LITTLE INTEREST OR PLEASURE IN DOING THINGS: NOT AT ALL

## 2025-02-13 ASSESSMENT — PAIN DESCRIPTION - DESCRIPTORS: DESCRIPTORS: ACHING

## 2025-02-13 NOTE — H&P
History Of Present Illness  Rehan Holman is a 69 y.o. male with known history of recurrent PEs currently on Lovenox and Coumadin presented to ED with left-sided chest pain and shortness of breath with exertion since late summer that is preventing him from performing his daily activities.  He denies dizziness/lightheadedness, fevers, chills, cough, sputum production, shortness of breath at rest, chest discomfort, peripheral edema.  Ultrasound of lower extremities negative for clots today, CTA negative for pulmonary embolism.  Stopped smoking tobacco 15 years ago, denies alcohol and drug use.    ED course: Patient's heart rate in the 30s, has since recovered to 58.  ECG showed complete heart block per ED provider.  Labs remarkable for BNP of 287.  INR within therapeutic range.     Past Medical History  Past Medical History:   Diagnosis Date    Carpal tunnel syndrome, right upper limb 06/28/2018    Carpal tunnel syndrome of right wrist    Left lower quadrant pain     Abdominal pain, LLQ (left lower quadrant)    Legal blindness, as defined in [place]     Legal blindness    Neuralgia and neuritis, unspecified     Neuritis    Obstructive sleep apnea (adult) (pediatric)     GUALBERTO on CPAP    Other vascular myelopathies     Neurogenic claudication    Pain in unspecified lower leg     Calf pain    Personal history of other diseases of male genital organs 05/20/2019    History of erectile dysfunction    Personal history of other diseases of the nervous system and sense organs     Personal history of otitis media    Personal history of other diseases of the nervous system and sense organs     History of sleep apnea    Personal history of other diseases of the respiratory system     Personal history of sinusitis    Personal history of other diseases of the respiratory system 03/16/2015    History of bronchitis    Personal history of other endocrine, nutritional and metabolic disease     History of hypercholesterolemia    Personal  history of other endocrine, nutritional and metabolic disease     History of diabetes mellitus    Personal history of other infectious and parasitic diseases 08/13/2015    History of Lyme disease    Personal history of other mental and behavioral disorders     History of anxiety disorder    Personal history of other mental and behavioral disorders     History of depression    Personal history of other specified conditions     History of exertional chest pain    Personal history of transient ischemic attack (TIA), and cerebral infarction without residual deficits     History of stroke    Sciatica, unspecified side     Sciatic nerve pain    Unqualified visual loss, right eye, normal vision left eye 09/19/2022    Vision loss of right eye    Unspecified disorder of synovium and tendon, unspecified shoulder     Rotator cuff dysfunction    Unspecified papilledema 10/06/2015    Optic disc edema       Surgical History  Past Surgical History:   Procedure Laterality Date    BACK SURGERY  06/03/2015    Back Surgery    ROTATOR CUFF REPAIR  11/05/2014    Rotator Cuff Repair        Social History  He reports that he has never smoked. He has never been exposed to tobacco smoke. He has never used smokeless tobacco. He reports that he does not currently use alcohol. He reports that he does not use drugs.    Family History  Family History   Problem Relation Name Age of Onset    Other (cardiac disorder) Father      Leukemia Father      Other (central retinal artery occlusion) Sister          Allergies  Patient has no known allergies.    Review of Systems  10 point ROS negative except as specified in HPI    Physical Exam  Constitutional:       Comments: Comfortable appearing in supine   HENT:      Head: Atraumatic.      Nose: Nose normal.   Eyes:      Conjunctiva/sclera: Conjunctivae normal.   Cardiovascular:      Rate and Rhythm: Regular rhythm. Bradycardia present.   Pulmonary:      Effort: Pulmonary effort is normal.      Breath  "sounds: Normal breath sounds.   Abdominal:      Palpations: Abdomen is soft.      Tenderness: There is no abdominal tenderness.   Skin:     General: Skin is warm and dry.   Neurological:      Mental Status: He is alert. Mental status is at baseline.   Psychiatric:         Behavior: Behavior normal.          Last Recorded Vitals  Blood pressure 163/72, pulse 51, temperature 36.2 °C (97.2 °F), temperature source Temporal, resp. rate 19, height 1.88 m (6' 2\"), weight 116 kg (256 lb), SpO2 94%.    Relevant Results    Results for orders placed or performed during the hospital encounter of 02/12/25 (from the past 24 hours)   CBC and Auto Differential   Result Value Ref Range    WBC 8.2 4.4 - 11.3 x10*3/uL    nRBC 0.0 0.0 - 0.0 /100 WBCs    RBC 4.55 4.50 - 5.90 x10*6/uL    Hemoglobin 14.0 13.5 - 17.5 g/dL    Hematocrit 42.0 41.0 - 52.0 %    MCV 92 80 - 100 fL    MCH 30.8 26.0 - 34.0 pg    MCHC 33.3 32.0 - 36.0 g/dL    RDW 13.2 11.5 - 14.5 %    Platelets 247 150 - 450 x10*3/uL    Neutrophils % 59.0 40.0 - 80.0 %    Immature Granulocytes %, Automated 0.5 0.0 - 0.9 %    Lymphocytes % 25.9 13.0 - 44.0 %    Monocytes % 12.9 2.0 - 10.0 %    Eosinophils % 1.3 0.0 - 6.0 %    Basophils % 0.4 0.0 - 2.0 %    Neutrophils Absolute 4.83 1.20 - 7.70 x10*3/uL    Immature Granulocytes Absolute, Automated 0.04 0.00 - 0.70 x10*3/uL    Lymphocytes Absolute 2.12 1.20 - 4.80 x10*3/uL    Monocytes Absolute 1.06 (H) 0.10 - 1.00 x10*3/uL    Eosinophils Absolute 0.11 0.00 - 0.70 x10*3/uL    Basophils Absolute 0.03 0.00 - 0.10 x10*3/uL   Magnesium   Result Value Ref Range    Magnesium 1.84 1.60 - 2.40 mg/dL   Comprehensive metabolic panel   Result Value Ref Range    Glucose 132 (H) 74 - 99 mg/dL    Sodium 139 136 - 145 mmol/L    Potassium 3.9 3.5 - 5.3 mmol/L    Chloride 106 98 - 107 mmol/L    Bicarbonate 25 21 - 32 mmol/L    Anion Gap 12 10 - 20 mmol/L    Urea Nitrogen 13 6 - 23 mg/dL    Creatinine 1.02 0.50 - 1.30 mg/dL    eGFR 80 >60 " mL/min/1.73m*2    Calcium 9.0 8.6 - 10.3 mg/dL    Albumin 4.2 3.4 - 5.0 g/dL    Alkaline Phosphatase 57 33 - 136 U/L    Total Protein 7.3 6.4 - 8.2 g/dL    AST 19 9 - 39 U/L    Bilirubin, Total 0.7 0.0 - 1.2 mg/dL    ALT 30 10 - 52 U/L   Protime-INR   Result Value Ref Range    Protime 30.2 (H) 9.8 - 12.8 seconds    INR 2.7 (H) 0.9 - 1.1   aPTT   Result Value Ref Range    aPTT 48 (H) 27 - 38 seconds   B-Type Natriuretic Peptide   Result Value Ref Range     (H) 0 - 99 pg/mL   Troponin I, High Sensitivity, Initial   Result Value Ref Range    Troponin I, High Sensitivity 8 0 - 20 ng/L   Troponin, High Sensitivity, 1 Hour   Result Value Ref Range    Troponin I, High Sensitivity 9 0 - 20 ng/L   Protime-INR   Result Value Ref Range    Protime 26.2 (H) 9.8 - 12.8 seconds    INR 2.3 (H) 0.9 - 1.1     CT angio chest for pulmonary embolism    Result Date: 2/12/2025  STUDY: CT Angiogram of the Chest; 2/12/2025 at 7:37 PM. INDICATION: Dyspnea, history of pulmonary embolism. COMPARISON: None available. ACCESSION NUMBER(S): [id number] ORDERING CLINICIAN: LALA QUIROZ TECHNIQUE:  CTA of the chest was performed with intravenous contrast. Images are reviewed and processed at a workstation according to the CT angiogram protocol with 3-D and/or MIP post processing imaging generated.  Omnipaque 350 75 mL was administered intravenously. Automated mA/kV exposure control was utilized and patient examination was performed in strict accordance with principles of ALARA. FINDINGS: Pulmonary arteries are adequately opacified without acute or chronic filling defects.  The main pulmonary artery measures 4.1 cm transverse.  Thoracic aorta is not adequately opacified (essentially noncontrast appearance secondary to contrast bolus timing) to evaluate for dissection.  There is minimal calcific plaque of the thoracic aorta without aneurysm. Heart is mildly enlarged.  Coronary artery calcifications are present especially of the LAD.  No  pericardial effusion.  The esophagus is nondilated.  There is no mediastinal hematoma or mass. Mild generalized air trapping.  Relatively low depth of inspiration. No consolidative airspace disease or pulmonary edema.  No lung mass or suspicious nodule.  Central airways are patent. Upper abdomen demonstrates no acute pathology.  Small hypoattenuating lesion within the midportion of the left kidney measuring approximately 1.2 cm not adequately characterized by this examination and due to its small size.  Normal size of the spleen.  The adrenal glands are unremarkable.  Punctate 1 mm nonobstructing left renal calculus. There are no acute fractures.  No suspicious bony lesions.  Multiple left rib fractures.    No pulmonary embolism. Mildly enlarged main pulmonary artery may reflect pulmonary arterial hypertension. Coronary artery calcification.  Cardiomegaly.  No pneumonia or pulmonary edema.  Changes of probable air trapping noted. Signed by Clark Palumbo DO    XR chest 1 view    Result Date: 2/12/2025  Interpreted By:  Messi Rayo, STUDY: XR CHEST 1 VIEW;  2/12/2025 5:30 pm   INDICATION: Signs/Symptoms:dyspnea.   COMPARISON: 07/06/2010   ACCESSION NUMBER(S): [id number]   ORDERING CLINICIAN: LALA QUIROZ   FINDINGS: No consolidation. No pleural effusion or pneumothorax. Borderline heart size. No acute osseous abnormality.       No acute cardiopulmonary abnormality.   Signed by: Messi Rayo 2/12/2025 5:55 PM Dictation workstation:   [id number]    Vascular US Lower Extremity Venous Duplex Bilateral    Result Date: 2/12/2025           [place] [address] Tel [phone] and Fax [phone]  Vascular Lab Report VASC US LOWER EXTREMITY VENOUS DUPLEX BILATERAL  Patient Name:     LOREN Malhotra Physician: [id number] Renetta Coleman MD Study Date:       2/12/2025           Ordering           [id number]  LALA QUIROZ                                       Physician: MRN/PID:          [id number]            Technologist:      Agueda Burton RVT Accession#:       [id number]        Technologist 2: Date of           1955 / 69      Encounter#:        [id number] Birth/Age:        years Gender:           M Admission Status: Emergency           Location           [facility]                                       Performed:  Diagnosis/ICD: Other specified soft tissue disorders-M79.89 Indication:    Limb swelling CPT Codes:     09451 Peripheral venous duplex scan for DVT complete  CONCLUSIONS: Right Lower Venous: No evidence of acute deep vein thrombus visualized in the right lower extremity. Calf veins visualized in segments. Left Lower Venous: No evidence of acute deep vein thrombus visualized in the left lower extremity. Calf veins visualized in segments.  Additional Findings: Pulsatile waveforms are suggestive of possible underlying volume overlaod.  Imaging & Doppler Findings:  Right                 Compressible Thrombus        Flow Distal External Iliac     Yes        None       Pulsatile CFV                       Yes        None       Pulsatile PFV                       Yes        None FV Proximal               Yes        None       Pulsatile FV Mid                    Yes        None FV Distal                 Yes        None Popliteal                 Yes        None   Spontaneous/Phasic Peroneal                  Yes        None PTV                       Yes        None  Left                  Compress Thrombus   Flow Distal External Iliac   Yes      None   Pulsatile CFV                     Yes      None   Pulsatile PFV                     Yes      None FV Proximal             Yes      None   Pulsatile FV Mid                  Yes      None FV Distal               Yes      None Popliteal               Yes      None   Pulsatile Peroneal                Yes      None PTV                     Yes      None   [id number] Renetta Coleman MD Electronically signed by [id number] Renetta Coleman MD on 2/12/2025 at 5:34:38 PM  ** Final **         Assessment/Plan   Assessment & Plan  Lung consolidation (CMS-HCC)      69-year-old male with known history of recurrent PEs currently on Lovenox and Coumadin presented to ED with continued left-sided chest pain shortness of breath.  Patient found to be in complete heart block per ED provider with heart rate in 30s which has since improved.  Patient also given a dose of Zosyn in ED.  Patient admitted to stepdown under Dr. Fowler for further evaluation and care.    #Complete heart block  #Sinus bradycardia  #History of pulmonary embolus  - Treated with a dose of Zosyn in ED, will hold further antibiotics for now per radiology findings and history.  Check procalcitonin  - Cardio consult  - Follow echo  - Avoid calcium channel/beta blockers  - Continue Lovenox, Coumadin for previously diagnosed PEs  - PT/OT/social work    Chronic conditions  #PEs  #Hypertension  #Depression  #T2DM  #Obesity  #GERD  #Hyperlipidemia  #BPH  - Continue home meds when reconciled  - Cardiac/carb consistent diet       2/13: stable, discussed with cardiology, hold bb, potential PM tomorrow    I spent 45 minutes in the professional and overall care of this patient.      Clark Fowler, DO

## 2025-02-13 NOTE — CONSULTS
Cardiology Consult    Impression:  Shortness of breath  Intermittent complete heart block  History of PE.  On Coumadin  Hypertension  Hyperlipidemia  Diabetes  Nonischemic stress test 10/24  Echocardiogram 10/24 showing no structural heart disease  Plan:  Discontinue beta-blocker  Monitor on telemetry.  If remains in heart block after beta-blocker washout, will arrange for permanent pacemaker.  If heart block resolves, plan to discharge with event monitor and close follow-up with EP.  CC  Shortness of breath  HPI:  69-year-old man presenting to ER for evaluation of shortness of breath.  He has been short of breath for months.  In the fall he was diagnosed with PE.  Started on Eliquis.  He was in ER very and there was some question about a recurrent PE on Eliquis.  He was therefore switched to Coumadin.  Remains short of breath.  He was seen by cardiology in the fall for evaluation of shortness of breath.  Noted to have sinus rhythm with blocked PACs.  He was advised to discontinue beta-blockers.  He is continue taking Toprol-XL 50 mg daily.  Stress test and echo at the time of evaluation in October were normal.  Meds:  Scheduled medications  insulin lispro, 0-10 Units, subcutaneous, TID AC  lisinopril, 40 mg, oral, Daily  [Held by provider] metoprolol succinate XL, 50 mg, oral, Daily  perflutren lipid microspheres, 0.5-10 mL of dilution, intravenous, Once in imaging  psyllium, 1 packet, oral, Daily      Continuous medications     PRN medications  PRN medications: acetaminophen **OR** acetaminophen **OR** acetaminophen, dextrose, dextrose, glucagon, glucagon    PMHx:  As listed above  Social history:  Remote smoker.  No alcohol  Family history:  Noncontributory  Review of systems:  See HPI  Physical exam:  Vitals:    02/13/25 0800   BP: 163/72   Pulse: 51   Resp: 19   Temp:    SpO2: 94%      JVP not elevated. Carotid upstroke normal. No carotid bruits. Heart sounds normal. No extra sounds or murmurs. Chest clear.  Abdomen soft, nontender. No masses. Peripheral pulses palpable. No edema.  EKG:  Normal sinus rhythm.  Intermittent complete heart block on telemetry  Echo:  10/24: Normal EF  Labs:  Lab Results   Component Value Date    WBC 8.2 02/12/2025    HGB 14.0 02/12/2025    HCT 42.0 02/12/2025     02/12/2025    CHOL 221 (H) 10/02/2024    TRIG 187 (H) 10/02/2024    HDL 59.8 10/02/2024    ALT 30 02/12/2025    AST 19 02/12/2025     02/12/2025    K 3.9 02/12/2025     02/12/2025    CREATININE 1.02 02/12/2025    BUN 13 02/12/2025    CO2 25 02/12/2025    TSH 1.45 10/02/2024    INR 2.3 (H) 02/13/2025    HGBA1C 5.7 02/03/2025     par

## 2025-02-13 NOTE — PROGRESS NOTES
Pharmacy Medication History Review    Rehan Holman is a 69 y.o. male admitted for Lung consolidation ([facility]). Pharmacy reviewed the patient's [id number] medications and allergies for accuracy.    The list below reflectives the updated PTA list. Please review each medication in order reconciliation for additional clarification and justification.  Prior to Admission medications    Medication Sig Start Date End Date Authorizing Provider   amLODIPine-benazepriL (Lotrel) 5-40 mg capsule TAKE 1 CAPSULE BY MOUTH ONCE  DAILY   Yadiel Malone DO   aspirin 81 mg EC tablet Take 1 tablet (81 mg) by mouth once daily.   Historical Provider, MD   cholecalciferol (Vitamin D-3) 25 MCG (1000 UT) capsule Take 1 capsule (25 mcg) by mouth once daily.   Historical Provider, MD   FLUoxetine (PROzac) 40 mg capsule TAKE 2 CAPSULES BY MOUTH DAILY   Yadiel Malone DO   gabapentin (Neurontin) 300 mg capsule Take 2 capsules (600 mg) by mouth once daily in the morning.   Yadiel Malnoe DO   glimepiride (Amaryl) 4 mg tablet Take 1 tablet (4 mg) by mouth once daily.   Yadiel Malone DO   multivitamin tablet Take 1 tablet by mouth once daily.   Historical Provider, MD   pantoprazole (ProtoNix) 40 mg EC tablet TAKE 1 TABLET BY MOUTH ONCE  DAILY   Yadiel Malone DO   simvastatin (Zocor) 10 mg tablet TAKE 1 TABLET BY MOUTH DAILY   Yadiel Malone DO   warfarin (Coumadin) 5 mg tablet Take 1 tablet (5 mg) by mouth see administration instructions. 1.5 tablets on 2/13, 2 tablets on 2/14 & 2/15. Appointment for further instructions.   Historical Provider, MD        The list below reflectives the updated allergy list. Please review each documented allergy for additional clarification and justification.  Allergies  Reviewed by Isabel Kee RN on 2/12/2025   No Known Allergies         Below are additional concerns with the patient's PTA list.      Cecille Cabrera

## 2025-02-13 NOTE — CARE PLAN
The patient's goals for the shift include      The clinical goals for the shift include  monitor for decline in patients status    Over the shift, the patient did not make progress toward the following goals. Barriers to progression include medical decline. Recommendations to address these barriers include pacemaker, medication changes.

## 2025-02-13 NOTE — H&P
History Of Present Illness  Rehan Holman is a 69 y.o. male with known history of recurrent PEs currently on Lovenox and Coumadin presented to ED with left-sided chest pain and shortness of breath with exertion since late summer that is preventing him from performing his daily activities.  He denies dizziness/lightheadedness, fevers, chills, cough, sputum production, shortness of breath at rest, chest discomfort, peripheral edema.  Ultrasound of lower extremities negative for clots today, CTA negative for pulmonary embolism.  Stopped smoking tobacco 15 years ago, denies alcohol and drug use.    ED course: Patient's heart rate in the 30s, has since recovered to 58.  ECG showed complete heart block per ED provider.  Labs remarkable for BNP of 287.  INR within therapeutic range.     Past Medical History  Past Medical History:   Diagnosis Date    Carpal tunnel syndrome, right upper limb 06/28/2018    Carpal tunnel syndrome of right wrist    Left lower quadrant pain     Abdominal pain, LLQ (left lower quadrant)    Legal blindness, as defined in [place]     Legal blindness    Neuralgia and neuritis, unspecified     Neuritis    Obstructive sleep apnea (adult) (pediatric)     GUALBERTO on CPAP    Other vascular myelopathies     Neurogenic claudication    Pain in unspecified lower leg     Calf pain    Personal history of other diseases of male genital organs 05/20/2019    History of erectile dysfunction    Personal history of other diseases of the nervous system and sense organs     Personal history of otitis media    Personal history of other diseases of the nervous system and sense organs     History of sleep apnea    Personal history of other diseases of the respiratory system     Personal history of sinusitis    Personal history of other diseases of the respiratory system 03/16/2015    History of bronchitis    Personal history of other endocrine, nutritional and metabolic disease     History of hypercholesterolemia    Personal  history of other endocrine, nutritional and metabolic disease     History of diabetes mellitus    Personal history of other infectious and parasitic diseases 08/13/2015    History of Lyme disease    Personal history of other mental and behavioral disorders     History of anxiety disorder    Personal history of other mental and behavioral disorders     History of depression    Personal history of other specified conditions     History of exertional chest pain    Personal history of transient ischemic attack (TIA), and cerebral infarction without residual deficits     History of stroke    Sciatica, unspecified side     Sciatic nerve pain    Unqualified visual loss, right eye, normal vision left eye 09/19/2022    Vision loss of right eye    Unspecified disorder of synovium and tendon, unspecified shoulder     Rotator cuff dysfunction    Unspecified papilledema 10/06/2015    Optic disc edema       Surgical History  Past Surgical History:   Procedure Laterality Date    BACK SURGERY  06/03/2015    Back Surgery    ROTATOR CUFF REPAIR  11/05/2014    Rotator Cuff Repair        Social History  He reports that he has never smoked. He has never been exposed to tobacco smoke. He has never used smokeless tobacco. He reports that he does not currently use alcohol. He reports that he does not use drugs.    Family History  Family History   Problem Relation Name Age of Onset    Other (cardiac disorder) Father      Leukemia Father      Other (central retinal artery occlusion) Sister          Allergies  Patient has no known allergies.    Review of Systems  10 point ROS negative except as specified in HPI    Physical Exam  Constitutional:       Comments: Comfortable appearing in supine   HENT:      Head: Atraumatic.      Nose: Nose normal.   Eyes:      Conjunctiva/sclera: Conjunctivae normal.   Cardiovascular:      Rate and Rhythm: Regular rhythm. Bradycardia present.   Pulmonary:      Effort: Pulmonary effort is normal.      Breath  "sounds: Normal breath sounds.   Abdominal:      Palpations: Abdomen is soft.      Tenderness: There is no abdominal tenderness.   Skin:     General: Skin is warm and dry.   Neurological:      Mental Status: He is alert. Mental status is at baseline.   Psychiatric:         Behavior: Behavior normal.          Last Recorded Vitals  Blood pressure 153/83, pulse 58, temperature 36.2 °C (97.2 °F), temperature source Temporal, resp. rate 16, height 1.88 m (6' 2\"), weight 116 kg (256 lb), SpO2 95%.    Relevant Results    Results for orders placed or performed during the hospital encounter of 02/12/25 (from the past 24 hours)   CBC and Auto Differential   Result Value Ref Range    WBC 8.2 4.4 - 11.3 x10*3/uL    nRBC 0.0 0.0 - 0.0 /100 WBCs    RBC 4.55 4.50 - 5.90 x10*6/uL    Hemoglobin 14.0 13.5 - 17.5 g/dL    Hematocrit 42.0 41.0 - 52.0 %    MCV 92 80 - 100 fL    MCH 30.8 26.0 - 34.0 pg    MCHC 33.3 32.0 - 36.0 g/dL    RDW 13.2 11.5 - 14.5 %    Platelets 247 150 - 450 x10*3/uL    Neutrophils % 59.0 40.0 - 80.0 %    Immature Granulocytes %, Automated 0.5 0.0 - 0.9 %    Lymphocytes % 25.9 13.0 - 44.0 %    Monocytes % 12.9 2.0 - 10.0 %    Eosinophils % 1.3 0.0 - 6.0 %    Basophils % 0.4 0.0 - 2.0 %    Neutrophils Absolute 4.83 1.20 - 7.70 x10*3/uL    Immature Granulocytes Absolute, Automated 0.04 0.00 - 0.70 x10*3/uL    Lymphocytes Absolute 2.12 1.20 - 4.80 x10*3/uL    Monocytes Absolute 1.06 (H) 0.10 - 1.00 x10*3/uL    Eosinophils Absolute 0.11 0.00 - 0.70 x10*3/uL    Basophils Absolute 0.03 0.00 - 0.10 x10*3/uL   Magnesium   Result Value Ref Range    Magnesium 1.84 1.60 - 2.40 mg/dL   Comprehensive metabolic panel   Result Value Ref Range    Glucose 132 (H) 74 - 99 mg/dL    Sodium 139 136 - 145 mmol/L    Potassium 3.9 3.5 - 5.3 mmol/L    Chloride 106 98 - 107 mmol/L    Bicarbonate 25 21 - 32 mmol/L    Anion Gap 12 10 - 20 mmol/L    Urea Nitrogen 13 6 - 23 mg/dL    Creatinine 1.02 0.50 - 1.30 mg/dL    eGFR 80 >60 " mL/min/1.73m*2    Calcium 9.0 8.6 - 10.3 mg/dL    Albumin 4.2 3.4 - 5.0 g/dL    Alkaline Phosphatase 57 33 - 136 U/L    Total Protein 7.3 6.4 - 8.2 g/dL    AST 19 9 - 39 U/L    Bilirubin, Total 0.7 0.0 - 1.2 mg/dL    ALT 30 10 - 52 U/L   Protime-INR   Result Value Ref Range    Protime 30.2 (H) 9.8 - 12.8 seconds    INR 2.7 (H) 0.9 - 1.1   aPTT   Result Value Ref Range    aPTT 48 (H) 27 - 38 seconds   B-Type Natriuretic Peptide   Result Value Ref Range     (H) 0 - 99 pg/mL   Troponin I, High Sensitivity, Initial   Result Value Ref Range    Troponin I, High Sensitivity 8 0 - 20 ng/L   Troponin, High Sensitivity, 1 Hour   Result Value Ref Range    Troponin I, High Sensitivity 9 0 - 20 ng/L     CT angio chest for pulmonary embolism    Result Date: 2/12/2025  STUDY: CT Angiogram of the Chest; 2/12/2025 at 7:37 PM. INDICATION: Dyspnea, history of pulmonary embolism. COMPARISON: None available. ACCESSION NUMBER(S): [id number] ORDERING CLINICIAN: LALA QUIROZ TECHNIQUE:  CTA of the chest was performed with intravenous contrast. Images are reviewed and processed at a workstation according to the CT angiogram protocol with 3-D and/or MIP post processing imaging generated.  Omnipaque 350 75 mL was administered intravenously. Automated mA/kV exposure control was utilized and patient examination was performed in strict accordance with principles of ALARA. FINDINGS: Pulmonary arteries are adequately opacified without acute or chronic filling defects.  The main pulmonary artery measures 4.1 cm transverse.  Thoracic aorta is not adequately opacified (essentially noncontrast appearance secondary to contrast bolus timing) to evaluate for dissection.  There is minimal calcific plaque of the thoracic aorta without aneurysm. Heart is mildly enlarged.  Coronary artery calcifications are present especially of the LAD.  No pericardial effusion.  The esophagus is nondilated.  There is no mediastinal hematoma or mass. Mild  generalized air trapping.  Relatively low depth of inspiration. No consolidative airspace disease or pulmonary edema.  No lung mass or suspicious nodule.  Central airways are patent. Upper abdomen demonstrates no acute pathology.  Small hypoattenuating lesion within the midportion of the left kidney measuring approximately 1.2 cm not adequately characterized by this examination and due to its small size.  Normal size of the spleen.  The adrenal glands are unremarkable.  Punctate 1 mm nonobstructing left renal calculus. There are no acute fractures.  No suspicious bony lesions.  Multiple left rib fractures.    No pulmonary embolism. Mildly enlarged main pulmonary artery may reflect pulmonary arterial hypertension. Coronary artery calcification.  Cardiomegaly.  No pneumonia or pulmonary edema.  Changes of probable air trapping noted. Signed by Clark Palumbo,     XR chest 1 view    Result Date: 2/12/2025  Interpreted By:  Messi Rayo, STUDY: XR CHEST 1 VIEW;  2/12/2025 5:30 pm   INDICATION: Signs/Symptoms:dyspnea.   COMPARISON: 07/06/2010   ACCESSION NUMBER(S): [id number]   ORDERING CLINICIAN: LALA QUIROZ   FINDINGS: No consolidation. No pleural effusion or pneumothorax. Borderline heart size. No acute osseous abnormality.       No acute cardiopulmonary abnormality.   Signed by: Messi Rayo 2/12/2025 5:55 PM Dictation workstation:   [id number]    Vascular US Lower Extremity Venous Duplex Bilateral    Result Date: 2/12/2025           [place] [address] Tel [phone] and Fax [phone]  Vascular Lab Report VASC US LOWER EXTREMITY VENOUS DUPLEX BILATERAL  Patient Name:     LOREN Malhotra Physician: [id number] Renetta Coleman MD Study Date:       2/12/2025           Ordering           [id number] LALA QUIROZ                                       Physician: MRN/PID:          [id number]             Technologist:      Agueda Burton RVT Accession#:       [id number]        Technologist 2: Date of           1955 / 69      Encounter#:        [id number] Birth/Age:        years Gender:           M Admission Status: Emergency           Location           [facility]                                       Performed:  Diagnosis/ICD: Other specified soft tissue disorders-M79.89 Indication:    Limb swelling CPT Codes:     65649 Peripheral venous duplex scan for DVT complete  CONCLUSIONS: Right Lower Venous: No evidence of acute deep vein thrombus visualized in the right lower extremity. Calf veins visualized in segments. Left Lower Venous: No evidence of acute deep vein thrombus visualized in the left lower extremity. Calf veins visualized in segments.  Additional Findings: Pulsatile waveforms are suggestive of possible underlying volume overlaod.  Imaging & Doppler Findings:  Right                 Compressible Thrombus        Flow Distal External Iliac     Yes        None       Pulsatile CFV                       Yes        None       Pulsatile PFV                       Yes        None FV Proximal               Yes        None       Pulsatile FV Mid                    Yes        None FV Distal                 Yes        None Popliteal                 Yes        None   Spontaneous/Phasic Peroneal                  Yes        None PTV                       Yes        None  Left                  Compress Thrombus   Flow Distal External Iliac   Yes      None   Pulsatile CFV                     Yes      None   Pulsatile PFV                     Yes      None FV Proximal             Yes      None   Pulsatile FV Mid                  Yes      None FV Distal               Yes      None Popliteal               Yes      None   Pulsatile Peroneal                Yes      None PTV                     Yes      None  [id number] Renetta Coleman MD Electronically signed by [id number] Renetta Coleman MD on 2/12/2025 at 5:34:38 PM  **  Final **         Assessment/Plan   Assessment & Plan      69-year-old male with known history of recurrent PEs currently on Lovenox and Coumadin presented to ED with continued left-sided chest pain shortness of breath.  Patient found to be in complete heart block per ED provider with heart rate in 30s which has since improved.  Patient also given a dose of Zosyn in ED.  Patient admitted to stepdown under Dr. Fowler for further evaluation and care.    #Complete heart block  #Sinus bradycardia  #History of pulmonary embolus  - Treated with a dose of Zosyn in ED, will hold further antibiotics for now per radiology findings and history.  Check procalcitonin  - Cardio consult  - Follow echo  - Avoid calcium channel/beta blockers  - Continue Lovenox, Coumadin for previously diagnosed PEs  - PT/OT/social work    Chronic conditions  #PEs  #Hypertension  #Depression  #T2DM  #Obesity  #GERD  #Hyperlipidemia  #BPH  - Continue home meds when reconciled  - Cardiac/carb consistent diet       I spent 45 minutes in the professional and overall care of this patient.      Felipe Horowitz PA-C

## 2025-02-14 ENCOUNTER — APPOINTMENT (OUTPATIENT)
Dept: RADIOLOGY | Facility: HOSPITAL | Age: 70
DRG: 244 | End: 2025-02-14
Payer: MEDICARE

## 2025-02-14 PROBLEM — I44.1 AV BLOCK, MOBITZ II: Status: ACTIVE | Noted: 2025-02-12

## 2025-02-14 LAB
GLUCOSE BLD MANUAL STRIP-MCNC: 124 MG/DL (ref 74–99)
GLUCOSE BLD MANUAL STRIP-MCNC: 139 MG/DL (ref 74–99)
GLUCOSE BLD MANUAL STRIP-MCNC: 90 MG/DL (ref 74–99)
INR PPP: 2.5 (ref 0.9–1.1)
PROTHROMBIN TIME: 29 SECONDS (ref 9.8–12.8)

## 2025-02-14 PROCEDURE — 99232 SBSQ HOSP IP/OBS MODERATE 35: CPT | Performed by: STUDENT IN AN ORGANIZED HEALTH CARE EDUCATION/TRAINING PROGRAM

## 2025-02-14 PROCEDURE — 82947 ASSAY GLUCOSE BLOOD QUANT: CPT

## 2025-02-14 PROCEDURE — C1785 PMKR, DUAL, RATE-RESP: HCPCS | Performed by: INTERNAL MEDICINE

## 2025-02-14 PROCEDURE — 99152 MOD SED SAME PHYS/QHP 5/>YRS: CPT | Performed by: INTERNAL MEDICINE

## 2025-02-14 PROCEDURE — C1894 INTRO/SHEATH, NON-LASER: HCPCS | Performed by: INTERNAL MEDICINE

## 2025-02-14 PROCEDURE — 2720000007 HC OR 272 NO HCPCS: Performed by: INTERNAL MEDICINE

## 2025-02-14 PROCEDURE — 71045 X-RAY EXAM CHEST 1 VIEW: CPT | Performed by: STUDENT IN AN ORGANIZED HEALTH CARE EDUCATION/TRAINING PROGRAM

## 2025-02-14 PROCEDURE — C1892 INTRO/SHEATH,FIXED,PEEL-AWAY: HCPCS | Performed by: INTERNAL MEDICINE

## 2025-02-14 PROCEDURE — 99153 MOD SED SAME PHYS/QHP EA: CPT | Performed by: INTERNAL MEDICINE

## 2025-02-14 PROCEDURE — 71045 X-RAY EXAM CHEST 1 VIEW: CPT

## 2025-02-14 PROCEDURE — 2500000001 HC RX 250 WO HCPCS SELF ADMINISTERED DRUGS (ALT 637 FOR MEDICARE OP): Performed by: INTERNAL MEDICINE

## 2025-02-14 PROCEDURE — C1781 MESH (IMPLANTABLE): HCPCS | Performed by: INTERNAL MEDICINE

## 2025-02-14 PROCEDURE — C1898 LEAD, PMKR, OTHER THAN TRANS: HCPCS | Performed by: INTERNAL MEDICINE

## 2025-02-14 PROCEDURE — 33208 INSRT HEART PM ATRIAL & VENT: CPT | Performed by: INTERNAL MEDICINE

## 2025-02-14 PROCEDURE — 7100000009 HC PHASE TWO TIME - INITIAL BASE CHARGE: Performed by: INTERNAL MEDICINE

## 2025-02-14 PROCEDURE — 2500000001 HC RX 250 WO HCPCS SELF ADMINISTERED DRUGS (ALT 637 FOR MEDICARE OP)

## 2025-02-14 PROCEDURE — 2780000003 HC OR 278 NO HCPCS: Performed by: INTERNAL MEDICINE

## 2025-02-14 PROCEDURE — 2060000001 HC INTERMEDIATE ICU ROOM DAILY

## 2025-02-14 PROCEDURE — 94660 CPAP INITIATION&MGMT: CPT

## 2025-02-14 PROCEDURE — 85610 PROTHROMBIN TIME: CPT | Performed by: NURSE PRACTITIONER

## 2025-02-14 PROCEDURE — 2500000004 HC RX 250 GENERAL PHARMACY W/ HCPCS (ALT 636 FOR OP/ED): Performed by: INTERNAL MEDICINE

## 2025-02-14 PROCEDURE — 2500000001 HC RX 250 WO HCPCS SELF ADMINISTERED DRUGS (ALT 637 FOR MEDICARE OP): Performed by: NURSE PRACTITIONER

## 2025-02-14 PROCEDURE — 2500000002 HC RX 250 W HCPCS SELF ADMINISTERED DRUGS (ALT 637 FOR MEDICARE OP, ALT 636 FOR OP/ED): Performed by: INTERNAL MEDICINE

## 2025-02-14 PROCEDURE — 36415 COLL VENOUS BLD VENIPUNCTURE: CPT | Performed by: NURSE PRACTITIONER

## 2025-02-14 PROCEDURE — 2500000005 HC RX 250 GENERAL PHARMACY W/O HCPCS: Performed by: INTERNAL MEDICINE

## 2025-02-14 PROCEDURE — 2750000001 HC OR 275 NO HCPCS: Performed by: INTERNAL MEDICINE

## 2025-02-14 PROCEDURE — 7100000010 HC PHASE TWO TIME - EACH INCREMENTAL 1 MINUTE: Performed by: INTERNAL MEDICINE

## 2025-02-14 PROCEDURE — 99233 SBSQ HOSP IP/OBS HIGH 50: CPT | Performed by: INTERNAL MEDICINE

## 2025-02-14 DEVICE — IPG W1DR01 AZURE XT DR MRI WL USA BCP
Type: IMPLANTABLE DEVICE | Site: CHEST  WALL | Status: FUNCTIONAL
Brand: AZURE™ XT DR MRI SURESCAN™

## 2025-02-14 DEVICE — LEAD 5076-58 CAPSUREFIX NOVUS US EN
Type: IMPLANTABLE DEVICE | Site: HEART | Status: FUNCTIONAL
Brand: CAPSUREFIX® NOVUS

## 2025-02-14 DEVICE — LEAD 5076-52 CAPSUREFIX NOVUS US EN
Type: IMPLANTABLE DEVICE | Site: HEART | Status: FUNCTIONAL
Brand: CAPSUREFIX® NOVUS

## 2025-02-14 RX ORDER — TRAMADOL HYDROCHLORIDE 50 MG/1
50 TABLET ORAL EVERY 6 HOURS PRN
Status: DISCONTINUED | OUTPATIENT
Start: 2025-02-14 | End: 2025-02-15 | Stop reason: HOSPADM

## 2025-02-14 RX ORDER — ACETAMINOPHEN 500 MG
5 TABLET ORAL NIGHTLY PRN
Status: DISCONTINUED | OUTPATIENT
Start: 2025-02-14 | End: 2025-02-15 | Stop reason: HOSPADM

## 2025-02-14 RX ORDER — PHYTONADIONE 5 MG/1
5 TABLET ORAL ONCE
Status: COMPLETED | OUTPATIENT
Start: 2025-02-14 | End: 2025-02-14

## 2025-02-14 RX ORDER — CEFAZOLIN SODIUM 2 G/100ML
2 INJECTION, SOLUTION INTRAVENOUS ONCE
Status: COMPLETED | OUTPATIENT
Start: 2025-02-14 | End: 2025-02-14

## 2025-02-14 RX ORDER — CEFAZOLIN SODIUM 1 G/50ML
1 SOLUTION INTRAVENOUS EVERY 8 HOURS
Status: COMPLETED | OUTPATIENT
Start: 2025-02-14 | End: 2025-02-15

## 2025-02-14 RX ORDER — NALOXONE HYDROCHLORIDE 1 MG/ML
0.2 INJECTION INTRAMUSCULAR; INTRAVENOUS; SUBCUTANEOUS EVERY 5 MIN PRN
Status: DISCONTINUED | OUTPATIENT
Start: 2025-02-14 | End: 2025-02-15 | Stop reason: HOSPADM

## 2025-02-14 RX ORDER — FENTANYL CITRATE 50 UG/ML
INJECTION, SOLUTION INTRAMUSCULAR; INTRAVENOUS AS NEEDED
Status: DISCONTINUED | OUTPATIENT
Start: 2025-02-14 | End: 2025-02-14 | Stop reason: HOSPADM

## 2025-02-14 RX ORDER — MIDAZOLAM HYDROCHLORIDE 1 MG/ML
INJECTION, SOLUTION INTRAMUSCULAR; INTRAVENOUS AS NEEDED
Status: DISCONTINUED | OUTPATIENT
Start: 2025-02-14 | End: 2025-02-14 | Stop reason: HOSPADM

## 2025-02-14 RX ORDER — LIDOCAINE HYDROCHLORIDE 20 MG/ML
INJECTION, SOLUTION INFILTRATION; PERINEURAL AS NEEDED
Status: DISCONTINUED | OUTPATIENT
Start: 2025-02-14 | End: 2025-02-14 | Stop reason: HOSPADM

## 2025-02-14 RX ADMIN — PHYTONADIONE 5 MG: 5 TABLET ORAL at 12:36

## 2025-02-14 RX ADMIN — TRAMADOL HYDROCHLORIDE 50 MG: 50 TABLET, COATED ORAL at 21:47

## 2025-02-14 RX ADMIN — Medication 5 MG: at 21:47

## 2025-02-14 RX ADMIN — CEFAZOLIN SODIUM 2 G: 2 INJECTION, SOLUTION INTRAVENOUS at 16:01

## 2025-02-14 RX ADMIN — CEFAZOLIN SODIUM 1 G: 1 INJECTION, SOLUTION INTRAVENOUS at 23:54

## 2025-02-14 RX ADMIN — GABAPENTIN 600 MG: 300 CAPSULE ORAL at 08:29

## 2025-02-14 ASSESSMENT — PAIN SCALES - GENERAL
PAINLEVEL_OUTOF10: 0 - NO PAIN
PAINLEVEL_OUTOF10: 5 - MODERATE PAIN
PAINLEVEL_OUTOF10: 0 - NO PAIN
PAINLEVEL_OUTOF10: 0 - NO PAIN

## 2025-02-14 ASSESSMENT — COGNITIVE AND FUNCTIONAL STATUS - GENERAL
CLIMB 3 TO 5 STEPS WITH RAILING: A LITTLE
WALKING IN HOSPITAL ROOM: A LITTLE
DAILY ACTIVITIY SCORE: 22
HELP NEEDED FOR BATHING: A LITTLE
TOILETING: A LITTLE
STANDING UP FROM CHAIR USING ARMS: A LITTLE
MOVING TO AND FROM BED TO CHAIR: A LITTLE
MOBILITY SCORE: 20

## 2025-02-14 ASSESSMENT — PAIN - FUNCTIONAL ASSESSMENT
PAIN_FUNCTIONAL_ASSESSMENT: 0-10

## 2025-02-14 ASSESSMENT — PAIN DESCRIPTION - DESCRIPTORS: DESCRIPTORS: ACHING

## 2025-02-14 NOTE — NURSING NOTE
Report called to 8th floor RN, final site assessment and vitals stable, pt meets transfer criteria, pt awake and alert, transferred back to 8th floor by cath lab RN, new ppm box and paperwork transferred with patient

## 2025-02-14 NOTE — PROGRESS NOTES
"Rehan Holman is a 69 y.o. male on day 1 of admission presenting with Lung consolidation (CMS-HCC).            HPI:    The patient has been off metoprolol and his heart rate at rest is between 50 and 60 bpm.  With exertion, however his heart rate is only 45-50 with intermittent 2-1 AV block.    Physical Exam:    General Appearance:  Alert, oriented, no distress  Skin:  Warm and dry  Head and Neck:  No elevation of JVP, no carotid bruits  Cardiac Exam:  Rhythm is regular, S1 and S2 are normal, no murmur S3 or S4  Lungs:  Clear to auscultation  Extremities:  no edema  Neurologic:  No focal deficits  Psychiatric:  Appropriate mood and behavior     Last Recorded Vitals  Blood pressure 148/74, pulse 73, temperature 35.8 °C (96.4 °F), resp. rate 20, height 1.88 m (6' 2\"), weight 116 kg (256 lb), SpO2 94%.  Intake/Output last 3 Shifts:  I/O last 3 completed shifts:  In: - (0 mL/kg)   Out: 700 (6 mL/kg) [Urine:700 (0.2 mL/kg/hr)]  Weight: 116.1 kg     Medications:  Scheduled medications  aspirin, 81 mg, oral, Daily  cholecalciferol, 2,000 Units, oral, Daily  FLUoxetine, 80 mg, oral, Daily  gabapentin, 600 mg, oral, q AM  insulin lispro, 0-10 Units, subcutaneous, TID AC  lisinopril, 40 mg, oral, Daily  [Held by provider] metoprolol succinate XL, 50 mg, oral, Daily  pantoprazole, 40 mg, oral, Daily  perflutren lipid microspheres, 0.5-10 mL of dilution, intravenous, Once in imaging  phytonadione, 5 mg, oral, Once  psyllium, 1 packet, oral, Daily  simvastatin, 10 mg, oral, Daily  [Held by provider] warfarin, 10 mg, oral, Once  [Held by provider] warfarin, 10 mg, oral, Daily        Labs:    CMP:  Recent Labs     02/12/25  1656 10/02/24  1023 07/10/24  1052 01/11/24  1114 07/11/23  1108 12/20/22  0724 06/16/22  1907 03/15/22  1113    139 137 135* 138 135* 137 138   K 3.9 4.3 4.3 4.6 4.3 4.7 4.4 4.1    101 100 99 102 98 100 100   CO2 25 30 26 27 26 29 28 25   ANIONGAP 12 12 15 14 14 13 13 17   BUN 13 13 11 13 15 19 14 14 " "  CREATININE 1.02 0.74 0.75 0.78 0.77 1.01 0.87 0.82   EGFR 80 >90 >90 >90  --   --   --   --    MG 1.84  --   --   --   --   --   --   --      Recent Labs     02/12/25  1656 10/02/24  1023 07/10/24  1052 01/11/24  1114 07/11/23  1108   ALBUMIN 4.2 4.4 4.4 4.6 4.3   ALKPHOS 57 58 55 55 59   ALT 30 39 31 33 27   AST 19 28 21 23 23   BILITOT 0.7 1.1 1.1 1.4* 0.9     CBC:  Recent Labs     02/12/25  1656 10/02/24  1023 07/10/24  1052 01/11/24  1114 07/11/23  1108 12/20/22  0724 06/16/22  1907 03/15/22  1113   WBC 8.2 6.3 6.6 6.3 6.7 8.6 6.9 5.9   HGB 14.0 14.7 14.9 15.4 14.0 15.2 14.5 14.8   HCT 42.0 45.1 46.7 46.5 43.9 47.5 43.8 45.9    272 278 265 258 271 242 231   MCV 92 92 95 91 94 94 93 95     COAG:   Recent Labs     02/14/25  0949 02/13/25 2029 02/13/25  0855 02/12/25  1656   INR 2.5* 2.4* 2.3* 2.7*     ABO: No results for input(s): \"ABO\" in the last 37209 hours.  HEME/ENDO:  Recent Labs     02/03/25  1228 10/02/24  1023 07/10/24  1058 04/03/24  1109 01/11/24  1239 01/11/24  1114 07/11/23  1111 07/11/23  1108 12/20/22  0725 12/20/22  0724   TSH  --  1.45  --   --   --  0.92  --  1.30  --  1.42   HGBA1C 5.7  --  5.7 6.6* 8.6*  --    < >  --    < >  --     < > = values in this interval not displayed.      CARDIAC:   Recent Labs     02/12/25  1823 02/12/25  1656   TROPHS 9 8   BNP  --  287*     Recent Labs     10/02/24  1023 07/10/24  1052 01/11/24  1114 07/11/23  1108 12/20/22  0724 06/16/22  1907   CHOL 221* 220* 222* 179 225* 206*   LDLF  --   --   --  105* 131* 113*   HDL 59.8 52.9 57.6 54.4 57.2 61.1   TRIG 187* 247* 182* 98 183* 162*     MICRO:   Recent Labs     02/13/25  0219   PROCAL 0.05     No results found for the last 90 days.      Test Results:    Telemetry: Sinus rhythm with periods of 2-1 AV block  Echocardiogram February 13, 2025: Ejection fraction 55 to 60%  Assessment/Plan:    1.  Mobitz 2 AV block: This patient continues to have episodes of 2-1 AV block and is symptomatic with ambulation.  " A dual-chamber permanent pacemaker is recommended.  The device implantation procedure and risks were discussed.  We will plan for administration of 5 mg of vitamin K p.o. this afternoon and then proceed with the pacemaker at 4 PM.  Hold warfarin for now.  The pacemaker implantation procedure and risks were discussed with the patient and he is in agreement with the recommendation.    James C Ramicone, DO

## 2025-02-14 NOTE — PROGRESS NOTES
02/14/25 1216   Discharge Planning   Living Arrangements Alone   Support Systems Family members   Assistance Needed Independent, patient does drive   Type of Residence Private residence  (one story home)   Number of Stairs to Enter Residence 2   Home or Post Acute Services None   Expected Discharge Disposition Home   Intensity of Service   Intensity of Service 0-30 min     Met with patient at bedside, introduced self and role on care transitions team. Admission assessment completed with patient. Address, insurance and contact information verified. Patient lives at home. Patient is diabetic, checks his blood sugar at home, has working glucometer/supplies. Patient wears cpap at night. Patient has declined any home going needs. Plan to return home at discharge.    PCP: Dr. Yadiel Malone, last visit was one week ago  Pharmacy: [facility] on [address]. Patient states he is able to afford and obtain his home medications.

## 2025-02-14 NOTE — PROGRESS NOTES
"Rehan Holman is a 69 y.o. male on day 1 of admission presenting with Lung consolidation (CMS-HCC).    Subjective   HR still low, despite off BB       Objective     Physical Exam  Constitutional:       Appearance: Normal appearance.   HENT:      Head: Normocephalic and atraumatic.      Right Ear: Tympanic membrane and ear canal normal.      Left Ear: Tympanic membrane and ear canal normal.      Mouth/Throat:      Mouth: Mucous membranes are moist.      Pharynx: Oropharynx is clear.   Eyes:      Extraocular Movements: Extraocular movements intact.      Conjunctiva/sclera: Conjunctivae normal.      Pupils: Pupils are equal, round, and reactive to light.   Cardiovascular:      Rate and Rhythm: Normal rate and regular rhythm.      Pulses: Normal pulses.      Heart sounds: Normal heart sounds.   Pulmonary:      Effort: Pulmonary effort is normal.      Breath sounds: Normal breath sounds.   Abdominal:      General: Abdomen is flat. Bowel sounds are normal.      Palpations: Abdomen is soft.   Musculoskeletal:         General: Normal range of motion.      Cervical back: Normal range of motion and neck supple.   Skin:     General: Skin is warm and dry.      Capillary Refill: Capillary refill takes 2 to 3 seconds.   Neurological:      General: No focal deficit present.      Mental Status: He is alert and oriented to person, place, and time. Mental status is at baseline.   Psychiatric:         Mood and Affect: Mood normal.         Behavior: Behavior normal.         Thought Content: Thought content normal.         Judgment: Judgment normal.         Last Recorded Vitals  Blood pressure 143/64, pulse (!) 34, temperature 35.4 °C (95.7 °F), resp. rate 20, height 1.88 m (6' 2\"), weight 116 kg (256 lb), SpO2 94%.  Intake/Output last 3 Shifts:  I/O last 3 completed shifts:  In: - (0 mL/kg)   Out: 700 (6 mL/kg) [Urine:700 (0.2 mL/kg/hr)]  Weight: 116.1 kg     Relevant Results            This patient currently has cardiac telemetry " ordered; if you would like to modify or discontinue the telemetry order, click here to go to the orders activity to modify/discontinue the order.                 Assessment/Plan   Assessment & Plan  Lung consolidation (CMS-HCC)    69-year-old male with known history of recurrent PEs currently on Lovenox and Coumadin presented to ED with continued left-sided chest pain shortness of breath.  Patient found to be in complete heart block per ED provider with heart rate in 30s which has since improved.  Patient also given a dose of Zosyn in ED.  Patient admitted to stepdown under Dr. Fowler for further evaluation and care.     #Complete heart block  #Sinus bradycardia  #History of pulmonary embolus  - Treated with a dose of Zosyn in ED, will hold further antibiotics for now per radiology findings and history.  Check procalcitonin  - Cardio consult  - Follow echo  - Avoid calcium channel/beta blockers  - Continue Lovenox, Coumadin for previously diagnosed PEs  - PT/OT/social work     Chronic conditions  #PEs  #Hypertension  #Depression  #T2DM  #Obesity  #GERD  #Hyperlipidemia  #BPH  - Continue home meds when reconciled  - Cardiac/carb consistent diet     2/13: stable, discussed with cardiology, hold bb, potential PM tomorrow     I spent 45 minutes in the professional and overall care of this patient.     2/14: recommend PPM givne perios of low HR, however there is a boiler issue at hospital so not sure we we can get a PPM today or not, cardio following       I spent 35 minutes in the professional and overall care of this patient.      Clark Fowler, DO

## 2025-02-14 NOTE — POST-PROCEDURE NOTE
Physician Transition of Care Summary  Invasive Cardiovascular Lab    Procedure Date: 2/14/2025  Attending:    * James C Ramicone - Primary  Resident/Fellow/Other Assistant: Surgeons and Role:  * No surgeons found with a matching role *    Indications:   Pre-op Diagnosis      * AV block, Mobitz II [I44.1]    Post-procedure diagnosis:   Post-op Diagnosis     * AV block, Mobitz II [I44.1]    Procedure(s):     * PPM IMPLANT DUAL      Procedure Findings:       Description of the Procedure:   Medtronic dual-chamber pacemaker insertion for Mobitz 2 AV block    Complications:   None    Stents/Implants:   Implants       Pacemaker    Lead, Capsurefix Novus, 58 Cm - [id number] - [id number] - Implanted        Inventory item: LEAD, CAPSUREFIX NOVUS, 58 CM Model/Cat number: 5076-58    Serial number: [id number] : [employer]    Lot number: [id number] Device identifier: [id number]    Implant Date: 2/14/2025        GUDID Information       Request status Successful        Brand name: Capsurefix® Novus Version/Model: 5076-58    Company name: [employer]. MRI safety info as of 2/14/25: Labeling does not contain MRI Safety Information    Contains dry or latex rubber: No      GMDN P.T. name: Endocardial/interventricular septal pacing lead                As of 2/14/2025       Status: Implanted                      Pacemaker, Dual Chamber, [place] Mri Xt Dr - [id number] - [id number] - Implanted        Inventory item: PACEMAKER, DUAL CHAMBER, ROSALIE MRI XT DR Model/Cat number: W1DR01    Serial number: [id number] : [employer]    Lot number: [id number] Device identifier: [id number]    Implant Date: 2/14/2025        GUDID Information       Request status Successful        Brand name: [place]™ XT DR MRI SureScan™ Version/Model: W1DR01    Company name: [employer]. MRI safety info as of 2/14/25: MR Conditional    Contains dry or latex rubber: No      GMDN P.T. name: Dual-chamber implantable pacemaker,  rate-responsive                As of 2/14/2025       Status: Implanted                      Lead    Lead, Capsurefix Novus, 52 Cm - [id number] - [id number] - Implanted        Inventory item: LEAD, CAPSUREFIX NOVUS, 52 CM Model/Cat number: 5076-52    Serial number: [id number] : [employer]    Lot number: [id number] Device identifier: [id number]      GUDID Information       Request status Successful        Brand name: Capsurefix® Novus Version/Model: 5076-52    Company name: [employer]. MRI safety info as of 2/14/25: Labeling does not contain MRI Safety Information    Contains dry or latex rubber: No      GMDN P.T. name: Endocardial/interventricular septal pacing lead                As of 2/14/2025       Status: Implanted                              Anticoagulation/Antiplatelet Plan:   Hold anticoagulation today    Estimated Blood Loss:   10 mL    Anesthesia: Moderate Sedation Anesthesia Staff: No anesthesia staff entered.    Any Specimen(s) Removed:   No specimens collected during this procedure.    Disposition:         Electronically signed by: James C Ramicone, DO, 2/14/2025 5:26 PM

## 2025-02-15 VITALS
WEIGHT: 256 LBS | TEMPERATURE: 96.8 F | DIASTOLIC BLOOD PRESSURE: 63 MMHG | RESPIRATION RATE: 18 BRPM | HEIGHT: 74 IN | HEART RATE: 71 BPM | BODY MASS INDEX: 32.85 KG/M2 | OXYGEN SATURATION: 95 % | SYSTOLIC BLOOD PRESSURE: 135 MMHG

## 2025-02-15 LAB
ALBUMIN SERPL BCP-MCNC: 4.6 G/DL (ref 3.4–5)
ALP SERPL-CCNC: 65 U/L (ref 33–136)
ALT SERPL W P-5'-P-CCNC: 25 U/L (ref 10–52)
ANION GAP SERPL CALC-SCNC: 17 MMOL/L (ref 10–20)
AST SERPL W P-5'-P-CCNC: 17 U/L (ref 9–39)
BILIRUB SERPL-MCNC: 1.5 MG/DL (ref 0–1.2)
BUN SERPL-MCNC: 20 MG/DL (ref 6–23)
CALCIUM SERPL-MCNC: 9.3 MG/DL (ref 8.6–10.3)
CHLORIDE SERPL-SCNC: 98 MMOL/L (ref 98–107)
CO2 SERPL-SCNC: 21 MMOL/L (ref 21–32)
CREAT SERPL-MCNC: 1.01 MG/DL (ref 0.5–1.3)
EGFRCR SERPLBLD CKD-EPI 2021: 81 ML/MIN/1.73M*2
ERYTHROCYTE [DISTWIDTH] IN BLOOD BY AUTOMATED COUNT: 12.9 % (ref 11.5–14.5)
GLUCOSE BLD MANUAL STRIP-MCNC: 137 MG/DL (ref 74–99)
GLUCOSE BLD MANUAL STRIP-MCNC: 152 MG/DL (ref 74–99)
GLUCOSE SERPL-MCNC: 248 MG/DL (ref 74–99)
HCT VFR BLD AUTO: 47.7 % (ref 41–52)
HGB BLD-MCNC: 15.7 G/DL (ref 13.5–17.5)
INR PPP: 2.1 (ref 0.9–1.1)
MCH RBC QN AUTO: 30 PG (ref 26–34)
MCHC RBC AUTO-ENTMCNC: 32.9 G/DL (ref 32–36)
MCV RBC AUTO: 91 FL (ref 80–100)
NRBC BLD-RTO: 0 /100 WBCS (ref 0–0)
PLATELET # BLD AUTO: 305 X10*3/UL (ref 150–450)
POTASSIUM SERPL-SCNC: 3.8 MMOL/L (ref 3.5–5.3)
PROT SERPL-MCNC: 7.8 G/DL (ref 6.4–8.2)
PROTHROMBIN TIME: 23.8 SECONDS (ref 9.8–12.8)
RBC # BLD AUTO: 5.23 X10*6/UL (ref 4.5–5.9)
SODIUM SERPL-SCNC: 132 MMOL/L (ref 136–145)
WBC # BLD AUTO: 9.7 X10*3/UL (ref 4.4–11.3)

## 2025-02-15 PROCEDURE — 94660 CPAP INITIATION&MGMT: CPT

## 2025-02-15 PROCEDURE — 2500000001 HC RX 250 WO HCPCS SELF ADMINISTERED DRUGS (ALT 637 FOR MEDICARE OP): Performed by: NURSE PRACTITIONER

## 2025-02-15 PROCEDURE — 36415 COLL VENOUS BLD VENIPUNCTURE: CPT | Performed by: NURSE PRACTITIONER

## 2025-02-15 PROCEDURE — 85027 COMPLETE CBC AUTOMATED: CPT | Performed by: NURSE PRACTITIONER

## 2025-02-15 PROCEDURE — 2500000002 HC RX 250 W HCPCS SELF ADMINISTERED DRUGS (ALT 637 FOR MEDICARE OP, ALT 636 FOR OP/ED): Performed by: NURSE PRACTITIONER

## 2025-02-15 PROCEDURE — 99238 HOSP IP/OBS DSCHRG MGMT 30/<: CPT | Performed by: STUDENT IN AN ORGANIZED HEALTH CARE EDUCATION/TRAINING PROGRAM

## 2025-02-15 PROCEDURE — 82947 ASSAY GLUCOSE BLOOD QUANT: CPT

## 2025-02-15 PROCEDURE — 80053 COMPREHEN METABOLIC PANEL: CPT | Performed by: NURSE PRACTITIONER

## 2025-02-15 PROCEDURE — 99232 SBSQ HOSP IP/OBS MODERATE 35: CPT | Performed by: STUDENT IN AN ORGANIZED HEALTH CARE EDUCATION/TRAINING PROGRAM

## 2025-02-15 PROCEDURE — 2500000004 HC RX 250 GENERAL PHARMACY W/ HCPCS (ALT 636 FOR OP/ED): Performed by: INTERNAL MEDICINE

## 2025-02-15 PROCEDURE — 85610 PROTHROMBIN TIME: CPT | Performed by: NURSE PRACTITIONER

## 2025-02-15 PROCEDURE — 2500000001 HC RX 250 WO HCPCS SELF ADMINISTERED DRUGS (ALT 637 FOR MEDICARE OP)

## 2025-02-15 RX ORDER — TRAMADOL HYDROCHLORIDE 50 MG/1
50 TABLET ORAL EVERY 6 HOURS PRN
Qty: 10 TABLET | Refills: 0 | Status: SHIPPED | OUTPATIENT
Start: 2025-02-15 | End: 2025-02-18

## 2025-02-15 RX ORDER — WARFARIN SODIUM 5 MG/1
5 TABLET ORAL SEE ADMIN INSTRUCTIONS
Qty: 30 TABLET | Refills: 0 | Status: SHIPPED | OUTPATIENT
Start: 2025-02-15 | End: 2025-02-28 | Stop reason: SDUPTHER

## 2025-02-15 RX ADMIN — GABAPENTIN 600 MG: 300 CAPSULE ORAL at 09:06

## 2025-02-15 RX ADMIN — FLUOXETINE HYDROCHLORIDE 80 MG: 20 CAPSULE ORAL at 09:05

## 2025-02-15 RX ADMIN — SIMVASTATIN 10 MG: 10 TABLET, FILM COATED ORAL at 09:05

## 2025-02-15 RX ADMIN — CEFAZOLIN SODIUM 1 G: 1 INJECTION, SOLUTION INTRAVENOUS at 08:58

## 2025-02-15 RX ADMIN — ASPIRIN 81 MG: 81 TABLET, COATED ORAL at 09:06

## 2025-02-15 RX ADMIN — Medication 2000 UNITS: at 09:05

## 2025-02-15 RX ADMIN — LISINOPRIL 40 MG: 40 TABLET ORAL at 09:05

## 2025-02-15 RX ADMIN — PANTOPRAZOLE SODIUM 40 MG: 40 TABLET, DELAYED RELEASE ORAL at 09:05

## 2025-02-15 ASSESSMENT — PAIN - FUNCTIONAL ASSESSMENT: PAIN_FUNCTIONAL_ASSESSMENT: 0-10

## 2025-02-15 ASSESSMENT — PAIN SCALES - GENERAL: PAINLEVEL_OUTOF10: 0 - NO PAIN

## 2025-02-15 NOTE — PROGRESS NOTES
Subjective Data:  Stable overnight.  Symmetry with no acute events.  Chest x-ray no evidence of pneumothorax.     Objective Data:  Last Recorded Vitals:  Vitals:    02/15/25 0259 02/15/25 0311 02/15/25 0740 02/15/25 1139   BP:  131/71 121/80 135/63   BP Location:  Right arm Right arm    Patient Position:  Lying Lying    Pulse:  63 81 71   Resp: 17 18     Temp:  36.3 °C (97.3 °F) 35.2 °C (95.4 °F) 36 °C (96.8 °F)   TempSrc:  Temporal Temporal    SpO2:  94% 93% 95%   Weight:       Height:           Last Labs:  CBC - 2/15/2025:  8:48 AM  9.7 15.7 305    47.7      CMP - 2/15/2025:  8:48 AM  9.3 7.8 17 --- 1.5   _ 4.6 25 65      PTT - 2/12/2025:  4:56 PM  2.1   23.8 48     TROPHS   Date/Time Value Ref Range Status   02/12/2025 06:23 PM 9 0 - 20 ng/L Final   02/12/2025 04:56 PM 8 0 - 20 ng/L Final     BNP   Date/Time Value Ref Range Status   02/12/2025 04:56  0 - 99 pg/mL Final     HGBA1C   Date/Time Value Ref Range Status   02/03/2025 12:28 PM 5.7 4.2 - 6.5 % Final   07/10/2024 10:58 AM 5.7 4.2 - 6.5 % Final   12/20/2022 07:25 AM 9.7 4.2 - 6.5 % Final   09/19/2022 11:50 AM 7.2 4.2 - 6.5 % Final     LDLCALC   Date/Time Value Ref Range Status   10/02/2024 10:23  <=99 mg/dL Final     Comment:                                 Near   Borderline      AGE      Desirable  Optimal    High     High     Very High     0-19 Y     0 - 109     ---    110-129   >/= 130     ----    20-24 Y     0 - 119     ---    120-159   >/= 160     ----      >24 Y     0 -  99   100-129  130-159   160-189     >/=190     07/10/2024 10:52  <=99 mg/dL Final     Comment:                                 Near   Borderline      AGE      Desirable  Optimal    High     High     Very High     0-19 Y     0 - 109     ---    110-129   >/= 130     ----    20-24 Y     0 - 119     ---    120-159   >/= 160     ----      >24 Y     0 -  99   100-129  130-159   160-189     >/=190     01/11/2024 11:14  <=99 mg/dL Final     Comment:                                  Near   Borderline      AGE      Desirable  Optimal    High     High     Very High     0-19 Y     0 - 109     ---    110-129   >/= 130     ----    20-24 Y     0 - 119     ---    120-159   >/= 160     ----      >24 Y     0 -  99   100-129  130-159   160-189     >/=190       VLDL   Date/Time Value Ref Range Status   10/02/2024 10:23 AM 37 0 - 40 mg/dL Final   07/10/2024 10:52 AM 49 0 - 40 mg/dL Final   01/11/2024 11:14 AM 36 0 - 40 mg/dL Final      Last I/O:  I/O last 3 completed shifts:  In: 290 (2.5 mL/kg) [P.O.:240; IV Piggyback:50]  Out: 310 (2.7 mL/kg) [Urine:300 (0.1 mL/kg/hr); Blood:10]  Weight: 116.1 kg     Past Cardiology Tests (Last 3 Years):  EKG:  No results found for this or any previous visit from the past 1095 days.    Echo:  Transthoracic Echo (TTE) Complete 02/13/2025    Ejection Fractions:  EF   Date/Time Value Ref Range Status   02/13/2025 02:38 PM 58 %      Cath:  No results found for this or any previous visit from the past 1095 days.    Stress Test:  No results found for this or any previous visit from the past 1095 days.    Cardiac Imaging:  No results found for this or any previous visit from the past 1095 days.      Inpatient Medications:  Scheduled medications   Medication Dose Route Frequency    aspirin  81 mg oral Daily    cholecalciferol  2,000 Units oral Daily    FLUoxetine  80 mg oral Daily    gabapentin  600 mg oral q AM    insulin lispro  0-10 Units subcutaneous TID AC    lisinopril  40 mg oral Daily    metoprolol succinate XL  50 mg oral Daily    pantoprazole  40 mg oral Daily    perflutren lipid microspheres  0.5-10 mL of dilution intravenous Once in imaging    psyllium  1 packet oral Daily    simvastatin  10 mg oral Daily    [Held by provider] warfarin  10 mg oral Once    [Held by provider] warfarin  10 mg oral Daily     PRN medications   Medication    acetaminophen    Or    acetaminophen    Or    acetaminophen    dextrose    dextrose    glucagon    glucagon    melatonin     naloxone    traMADol     Continuous Medications   Medication Dose Last Rate       Physical Exam:  General: No acute distress,  A&O x3  Skin: Warm and dry  Neck: JVD is not elevated  ENT: Moist mucous membranes no lesions appreciated  Pulmonary: CTAB  Cards: Patient upper left chest wall.  Regular rate rhythm, no murmurs gallops or rubs appreciated normal S1-S2  Abdomen: Soft nontender nondistended  Extremities: No edema or cyanosis  Psych: Appropriate mood and affect     Assessment/Plan     1.  High-grade heart block: Second-degree heart block Mobitz type II: Now status post dual-chamber pacemaker.  Stable today.    2.  History of DVT/PE: Occurred in October.  Initially treated with Eliquis but then transition to warfarin in January after repeat CT imaging shows persistence of pulmonary filling defect.  This hospitalization a relook CT imaging shows resolution of thrombus.   -Reinitiate oral warfarin as instructed    (This note was generated with voice recognition software and may contain errors including spelling, grammar, syntax and missed recognition of what was dictated, of which may not have been fully corrected)     Code Status:  Full Code    Damaso Martins MD PhD

## 2025-02-15 NOTE — DISCHARGE SUMMARY
Discharge Diagnosis  Lung consolidation (CMS-HCC)    Issues Requiring Follow-Up  Please follow up with PCP and Cardiology as mentioned below       Pacemaker incision care instructions from Dr. Ramicone:    May remove sling from left arm February 15, 2025  Keep left arm below shoulder level for 2 weeks.  No heavy lifting or straining for 1 month.  Keep bandage on incision until seen in the office for a pacemaker incision check.  Please contact Dr. Kemar Herron to schedule a pacemaker incision check in 7 to 10 days.  May shower, but keep the bandage as dry as possible.    Okay to resume warfarin as prescribed on February 16, 2025.  Discharge Meds     Medication List      START taking these medications     traMADol 50 mg tablet; Commonly known as: Ultram; Take 1 tablet (50 mg)   by mouth every 6 hours if needed for moderate pain (4 - 6) for up to 3   days.     CHANGE how you take these medications     warfarin 5 mg tablet; Commonly known as: Coumadin; Take 1 tablet (5 mg)   by mouth see administration instructions. Do not restart coumadin until   2/16 per cardiology recs; What changed: additional instructions     CONTINUE taking these medications     amLODIPine-benazepriL 5-40 mg capsule; Commonly known as: Lotrel; TAKE 1   CAPSULE BY MOUTH ONCE  DAILY   aspirin 81 mg EC tablet   cholecalciferol 25 MCG (1000 UT) capsule; Commonly known as: Vitamin D-3   FLUoxetine 40 mg capsule; Commonly known as: PROzac; TAKE 2 CAPSULES BY   MOUTH DAILY   gabapentin 300 mg capsule; Commonly known as: Neurontin; TAKE 1 CAPSULE   BY MOUTH TWICE  DAILY   glimepiride 4 mg tablet; Commonly known as: Amaryl; TAKE 1 TABLET BY   MOUTH TWICE  DAILY   methocarbamol 500 mg tablet; Commonly known as: Robaxin; Take 1 tablet   (500 mg) by mouth 3 times a day.   metoprolol succinate XL 50 mg 24 hr tablet; Commonly known as:   Toprol-XL; TAKE 1 TABLET BY MOUTH ONCE  DAILY   multivitamin tablet   pantoprazole 40 mg EC tablet; Commonly known as:  ProtoNix; TAKE 1 TABLET   BY MOUTH ONCE  DAILY   simvastatin 10 mg tablet; Commonly known as: Zocor; TAKE 1 TABLET BY   MOUTH DAILY       Test Results Pending At Discharge  Pending Labs       No current pending labs.            Hospital Course   69-year-old male with known history of recurrent PEs currently on Lovenox and Coumadin presented to ED with continued left-sided chest pain shortness of breath.  Patient found to be in complete heart block per ED provider with heart rate in 30s which has since improved.  Patient also given a dose of Zosyn in ED.  Patient admitted to [facility] under Dr. Fowler for further evaluation and care.     #Complete heart block  #Sinus bradycardia  #History of pulmonary embolus  - Treated with a dose of Zosyn in ED, will hold further antibiotics for now per radiology findings and history.  Check procalcitonin  - Cardio consult  - Follow echo  - Avoid calcium channel/beta blockers  - Continue Lovenox, Coumadin for previously diagnosed PEs  - PT/OT/social work     Chronic conditions  #PEs  #Hypertension  #Depression  #T2DM  #Obesity  #GERD  #Hyperlipidemia  #BPH  - Continue home meds when reconciled  - Cardiac/carb consistent diet     2/13: stable, discussed with cardiology, hold bb, potential PM tomorrow     I spent 45 minutes in the professional and overall care of this patient.     2/14: recommend PPM givne perios of low HR, however there is a boiler issue at hospital so not sure we we can get a PPM today or not, cardio following    2/15: patient is s/p PPM with Dr. Ramicone patient is medical stable for discharge home   Will resume coumadin tomorrow     Pertinent Physical Exam At Time of Discharge  Physical Exam  Vitals and nursing note reviewed.   Constitutional:       Appearance: Normal appearance. He is normal weight.   HENT:      Head: Normocephalic.      Nose: Nose normal.      Mouth/Throat:      Mouth: Mucous membranes are moist.   Eyes:      Extraocular Movements: Extraocular  movements intact.      Conjunctiva/sclera: Conjunctivae normal.      Pupils: Pupils are equal, round, and reactive to light.   Cardiovascular:      Rate and Rhythm: Normal rate and regular rhythm.      Pulses: Normal pulses.      Heart sounds: Normal heart sounds.      Comments: Left chest wall pacer site dressing remains dry and intact  No signs of hematoma present   Pulmonary:      Effort: Pulmonary effort is normal.   Abdominal:      General: Abdomen is flat. Bowel sounds are normal.      Palpations: Abdomen is soft.   Musculoskeletal:         General: Tenderness present.      Cervical back: Normal range of motion.      Comments: Left chest wall tenderness noted      Skin:     General: Skin is warm and dry.   Neurological:      General: No focal deficit present.      Mental Status: He is alert and oriented to person, place, and time.   Psychiatric:         Mood and Affect: Mood normal.         Behavior: Behavior normal.         Outpatient Follow-Up  Future Appointments   Date Time Provider Department Center   5/5/2025 10:00 AM DO [facility] Vj Briggs, APRN-CNP

## 2025-02-15 NOTE — DISCHARGE INSTRUCTIONS
Pacemaker incision care instructions from Dr. Ramicone:    May remove sling from left arm February 15, 2025  Keep left arm below shoulder level for 2 weeks.  No heavy lifting or straining for 1 month.  Keep bandage on incision until seen in the office for a pacemaker incision check.  Please contact Dr. Kemar Herron to schedule a pacemaker incision check in 7 to 10 days.  May shower, but keep the bandage as dry as possible.    Okay to resume warfarin as prescribed on February 16, 2025.      Patient needs INR checked on Monday February 17, 2025   Order is in  lab

## 2025-02-15 NOTE — CARE PLAN
The clinical goals for the shift include patient will remain hemodynamically stable.    Problem: Pain - Adult  Goal: Verbalizes/displays adequate comfort level or baseline comfort level  Outcome: Progressing     Problem: Safety - Adult  Goal: Free from fall injury  Outcome: Progressing

## 2025-02-15 NOTE — CARE PLAN
The patient's goals for the shift include        Problem: Pain - Adult  Goal: Verbalizes/displays adequate comfort level or baseline comfort level  Outcome: Progressing     Problem: Safety - Adult  Goal: Free from fall injury  Outcome: Progressing     Problem: Discharge Planning  Goal: Discharge to home or other facility with appropriate resources  Outcome: Progressing     Problem: Chronic Conditions and Co-morbidities  Goal: Patient's chronic conditions and co-morbidity symptoms are monitored and maintained or improved  Outcome: Progressing     The clinical goals for the shift include hds    Over the shift, the patient did not make progress toward the following goals. Barriers to progression include

## 2025-02-17 ENCOUNTER — TELEPHONE (OUTPATIENT)
Dept: CARDIOLOGY | Facility: CLINIC | Age: 70
End: 2025-02-17
Payer: MEDICARE

## 2025-02-17 DIAGNOSIS — Z86.711 HISTORY OF PULMONARY EMBOLUS (PE): ICD-10-CM

## 2025-02-17 DIAGNOSIS — I44.1 AV BLOCK, MOBITZ II: ICD-10-CM

## 2025-02-17 LAB
ATRIAL RATE: 40 BPM
P AXIS: 73 DEGREES
Q ONSET: 219 MS
QRS COUNT: 7 BEATS
QRS DURATION: 92 MS
QT INTERVAL: 600 MS
QTC CALCULATION(BAZETT): 483 MS
QTC FREDERICIA: 520 MS
R AXIS: 72 DEGREES
T AXIS: 66 DEGREES
T OFFSET: 519 MS
VENTRICULAR RATE: 39 BPM

## 2025-02-17 NOTE — TELEPHONE ENCOUNTER
Patient called in regards on pacemaker inserted on 02/14/25 by Dr Ramicone, patients wants somebody to call back for medical advise and some concerns. Thank you

## 2025-02-18 LAB
INR PPP: 1.2
PROTHROMBIN TIME: 13 SEC (ref 9–11.5)

## 2025-02-24 ENCOUNTER — CLINICAL SUPPORT (OUTPATIENT)
Dept: CARDIOLOGY | Facility: CLINIC | Age: 70
End: 2025-02-24
Payer: MEDICARE

## 2025-02-24 NOTE — PROGRESS NOTES
Pt presented for site check following pacemaker implantation with Dr Ramicone on 2/17/25.     Surgical bandage removed, steri strips intact. Instructed pt to allow the steri strips to fall off naturally. Site is soft, device edges easily palpable. No redness, drainage, bleeding. Minimal swelling and bruising. Scheduled follow up appts, given to pt. Pt will be following up with Dr Ramicone and device checks in [place]. Questions answered. Activity restrictions reinforced.

## 2025-02-25 ENCOUNTER — HOSPITAL ENCOUNTER (OUTPATIENT)
Dept: CARDIOLOGY | Facility: CLINIC | Age: 70
Discharge: HOME | End: 2025-02-25
Payer: MEDICARE

## 2025-02-25 ENCOUNTER — TELEPHONE (OUTPATIENT)
Dept: CARDIOLOGY | Facility: CLINIC | Age: 70
End: 2025-02-25
Payer: MEDICARE

## 2025-02-25 DIAGNOSIS — Z86.711 HISTORY OF PULMONARY EMBOLUS (PE): ICD-10-CM

## 2025-02-25 DIAGNOSIS — I44.1 AV BLOCK, MOBITZ II: ICD-10-CM

## 2025-02-25 DIAGNOSIS — Z95.0 PRESENCE OF CARDIAC PACEMAKER: ICD-10-CM

## 2025-02-27 NOTE — DOCUMENTATION CLARIFICATION NOTE
"    PATIENT:               LOREN DUGAN  ACCT #:                  [id number]  MRN:                       [id number]  :                       1955  ADMIT DATE:       2025 4:14 PM  DISCH DATE:        2/15/2025 1:45 PM  RESPONDING PROVIDER #:        [id number]          PROVIDER RESPONSE TEXT:    Lung Consolidation/Lobar Pneumonia ruled out    CDI QUERY TEXT:    Clarification        Instruction:    Based on your assessment of the patient and the clinical information, please provide the requested documentation by clicking on the appropriate radio button and enter any additional information if prompted.    Question: Please clarify diagnosis of respiratory condition as:    When answering this query, please exercise your independent professional judgment. The fact that a question is being asked, does not imply that any particular answer is desired or expected.    The patient's clinical indicators include:  Clinical Information:  69-year-old male  presented to ED with continued left-sided chest pain shortness of breath.  Patient found to be in complete heart block      Clinical Indicators and Documentation:  -Vital Signs: Blood pressure 153/83, pulse 58, temperature 36.2C resp. rate 16, SpO2 95 percent    -ABG results: n/a    -Physical Exam Findings:  -Breath sounds: Normal breath sounds.    -Distress :No respiratory distress.    -Cyanosis: n/a    -Oxygen Therapy: n/a    -Pulmonary Consult:  Imagin25 CT Scan:  \"Mild generalized air trapping.  Relatively low  depth of inspiration.  No consolidative airspace disease or  pulmonary edema.  No lung mass or  suspicious nodule.  Central airways are  patent.\"    \"No pneumonia or pulmonary edema.  Changes of probable air trapping  noted.\"      25 Chest Xray:  \"No consolidation. No pleural effusion or pneumothorax. Borderline  heart size. No acute osseous abnormality\"      25  Chest Xray:  ?Lungs:  No focal consolidation, pulmonary edema,  pleural effusion " "or  pneumothorax.?    2/15/25 D/C Summary:  \"Discharge Diagnosis :  Lung consolidation\"        Treatment:  Chest Xray  IV Ancef  IV Zosyn    Risk Factors:  Heart block, Obesity  Options provided:  -- Lung Consolidation/Lobar Pneumonia ruled out  -- Lung Consolidation/Lobar Pneumonia ruled in, Lung Consolidation/Lobar Pneumonia ruled in  -- Other - I will add my own diagnosis  -- Refer to Clinical Documentation Reviewer    Query created by: Jeanette Olivares on 2/25/2025 3:50 PM      Electronically signed by:  MARGAIRTA ABRAHAM DO 2/27/2025 9:08 AM          "

## 2025-02-28 ENCOUNTER — TELEPHONE (OUTPATIENT)
Dept: CARDIOLOGY | Facility: CLINIC | Age: 70
End: 2025-02-28
Payer: MEDICARE

## 2025-02-28 DIAGNOSIS — I26.99 PULMONARY EMBOLISM, UNSPECIFIED CHRONICITY, UNSPECIFIED PULMONARY EMBOLISM TYPE, UNSPECIFIED WHETHER ACUTE COR PULMONALE PRESENT (MULTI): ICD-10-CM

## 2025-02-28 RX ORDER — WARFARIN SODIUM 5 MG/1
5 TABLET ORAL SEE ADMIN INSTRUCTIONS
Qty: 45 TABLET | Refills: 11 | Status: SHIPPED | OUTPATIENT
Start: 2025-02-28

## 2025-02-28 NOTE — TELEPHONE ENCOUNTER
Lisseth called from [facility]  for the PT, wanting to talk with Natacha. PT Needs new pt coumadin clinic orders,  so he can start coming out to [place] for his INR checks  
Pt is on coumadin for PE, would like to switch from [facility] coumadin clinic to [facility] Coumadin clinic. Pt needs new patient referral.   
Referral to [facility] coumadin clinic placed. Pended coumadin to Dr Ramicone.   
I personally spent

## 2025-02-28 NOTE — TELEPHONE ENCOUNTER
Left message for pt to return call at [phone].  Requested pt confirm location he would like to attend based on facility location and days of week clinic is open.

## 2025-03-03 NOTE — TELEPHONE ENCOUNTER
Spoke with patient to get him schedule for  AMS at the [place] location. Patient stated he did not  his new prescription for warfarin over the weekend because his script was not ready.  This LPN phoned the pharmacy to confirm that script is ready for  pickup. Nicole (from pharmacy) also stated medication has been ready all weekend and patient never called.  Patient is informed that his warfarin is ready for  from [facility] in [place], [place] . Patient scheduled at [place] tomorrow  for inr check. Secure chat sent to Dr. Ramicone and Natacha Messer RN . Patient verbally acknowledge understanding

## 2025-03-04 ENCOUNTER — ANTICOAGULATION - WARFARIN VISIT (OUTPATIENT)
Dept: CARDIOLOGY | Facility: CLINIC | Age: 70
End: 2025-03-04
Payer: MEDICARE

## 2025-03-04 DIAGNOSIS — I26.99 PULMONARY EMBOLISM, UNSPECIFIED CHRONICITY, UNSPECIFIED PULMONARY EMBOLISM TYPE, UNSPECIFIED WHETHER ACUTE COR PULMONALE PRESENT (MULTI): Primary | ICD-10-CM

## 2025-03-04 LAB
POC INR: 1
POC PROTHROMBIN TIME: NORMAL

## 2025-03-04 PROCEDURE — 85610 PROTHROMBIN TIME: CPT | Mod: QW

## 2025-03-04 PROCEDURE — 99211 OFF/OP EST MAY X REQ PHY/QHP: CPT

## 2025-03-04 NOTE — PROGRESS NOTES
Patient identification verified with 2 identifiers.    Location: [facility] [address] [place] [id number] [phone]     Referring Physician: James C Ramicone, DO   Enrollment/ Re-enrollment date: 2026   INR Goal: 2.0-3.0  INR monitoring is per AMS protocol.  Anticoagulation Medication: warfarin  Indication: Pulmonary Embolism (PE)    Subjective   Bleeding signs/symptoms: No  Patient identification verified with 2 identifiers.    Location: [facility] [address] [place] [address] [phone]     Referring Physician: James C Ramicone, DO   Enrollment/ Re-enrollment date: 2025   INR Goal: 2.0-3.0  INR monitoring is per AMS protocol.  Anticoagulation Medication: warfarin  Indication: Stroke/TIA and Pulmonary Embolism (PE)    Subjective   Bleeding signs/symptoms: No    Bruising: No   Major bleeding event: No  Thrombosis signs/symptoms: No  Thromboembolic event: No  Missed doses: No  Extra doses: No  Medication changes: No  Dietary changes: No  Change in health: No  Change in activity: No  Alcohol: No  Other concerns: No    Upcoming Procedures:  Does the Patient Have any upcoming procedures that require interruption in anticoagulation therapy? no  Does the patient require bridging? no      Anticoagulation Summary  As of 3/4/2025      INR goal:  2.0-3.0   TTR:  --   INR used for dosin.00 (3/4/2025)   Weekly warfarin total:  42.5 mg               Assessment/Plan   Subtherapeutic     1. New dose: no change    2. Next INR:  2025      Education provided to patient during the visit:  Patient instructed to call in interim with questions, concerns and changes.   Patient educated on interactions between medications and warfarin.   Patient educated on dietary consistency in vitamin k consumption.   Patient educated on affects of alcohol consumption while taking warfarin.   Patient educated on signs of bleeding/clotting.   Patient  educated on compliance with dosing, follow up appointments, and prescribed plan of care.

## 2025-03-07 ENCOUNTER — ANTICOAGULATION - WARFARIN VISIT (OUTPATIENT)
Dept: CARDIOLOGY | Facility: CLINIC | Age: 70
End: 2025-03-07
Payer: MEDICARE

## 2025-03-07 DIAGNOSIS — Z79.01 LONG TERM (CURRENT) USE OF ANTICOAGULANTS: ICD-10-CM

## 2025-03-07 DIAGNOSIS — I26.99 PULMONARY EMBOLISM, UNSPECIFIED CHRONICITY, UNSPECIFIED PULMONARY EMBOLISM TYPE, UNSPECIFIED WHETHER ACUTE COR PULMONALE PRESENT (MULTI): Primary | ICD-10-CM

## 2025-03-07 LAB
POC INR: 1.2
POC PROTHROMBIN TIME: NORMAL

## 2025-03-07 PROCEDURE — 99211 OFF/OP EST MAY X REQ PHY/QHP: CPT

## 2025-03-07 PROCEDURE — 85610 PROTHROMBIN TIME: CPT | Mod: QW

## 2025-03-07 NOTE — PROGRESS NOTES
Patient identification verified with 2 identifiers.    Location: [facility] - [address] [place] [address] [address]. [place], [place] [id number] [phone]     Referring Physician: James Ramicone, DO  Enrollment/ Re-enrollment date: 2026   INR Goal: 2.0-3.0  INR monitoring is per [facility] protocol.  Anticoagulation Medication: warfarin  Indication: Pulmonary Embolism (PE)  CBC drawn on February 15, 2025: H&H 15.7/47.7    Patient previously monitored via [facility] prior to transferring care to .    Subjective   Bleeding signs/symptoms: No    Bruising: No   Major bleeding event: No  Thrombosis signs/symptoms: No  Thromboembolic event: No  Missed doses: yes - Per previous note, patient ran out of warfarin tablets recently and subsequently, missed three doses. Patient restarted warfarin four days ago with dosing as follows: March 3 - 5mg, March 4 - 7.5mg, March 5 - 7.5mg,  - 7.5mg.  Extra doses: No  Medication changes: No  Dietary changes: No  Change in health: No  Change in activity: No  Alcohol: No  Other concerns: No    Upcoming Procedures:  Does the Patient Have any upcoming procedures that require interruption in anticoagulation therapy? no  Does the patient require bridging? no Per Dr. Ramicone - patient recently underwent pacemaker implantation; however, does NOT require LMWH bridge therapy.     Patient brought in his previous dosing schedule provided by [facility] on 2025.  Patient was alternating 7.5mg, 10mg, and 12.5mg warfarin doses - INR 1.9 at that time.        Anticoagulation Summary  As of 3/7/2025      INR goal:  2.0-3.0   TTR:  --   INR used for dosin.20 (3/7/2025)   Weekly warfarin total:  62.5 mg               Assessment/Plan   Subtherapeutic     1. New dose:  will INCREASE warfarin dose to 10mg/daily. Reviewed plan of care with Dr. Ramicone at 1010hrs and physician was agreeable with dosing plan.      2. Next INR:  3-5 days  Next INR March  11, 2025.    Education provided to patient during the visit:  Patient instructed to call in interim with questions, concerns and changes.   Patient educated on interactions between medications and warfarin.   Patient educated on dietary consistency in vitamin k consumption.   Patient educated on affects of alcohol consumption while taking warfarin.   Patient educated on signs of bleeding/clotting.   Patient educated on compliance with dosing, follow up appointments, and prescribed plan of care.

## 2025-03-11 ENCOUNTER — ANTICOAGULATION - WARFARIN VISIT (OUTPATIENT)
Dept: CARDIOLOGY | Facility: CLINIC | Age: 70
End: 2025-03-11
Payer: MEDICARE

## 2025-03-11 DIAGNOSIS — I26.99 PULMONARY EMBOLISM, UNSPECIFIED CHRONICITY, UNSPECIFIED PULMONARY EMBOLISM TYPE, UNSPECIFIED WHETHER ACUTE COR PULMONALE PRESENT (MULTI): Primary | ICD-10-CM

## 2025-03-11 DIAGNOSIS — Z79.01 LONG TERM (CURRENT) USE OF ANTICOAGULANTS: ICD-10-CM

## 2025-03-11 DIAGNOSIS — Z86.711 HISTORY OF PULMONARY EMBOLUS (PE): ICD-10-CM

## 2025-03-11 LAB
POC INR: 2.1
POC PROTHROMBIN TIME: NORMAL

## 2025-03-11 PROCEDURE — 99211 OFF/OP EST MAY X REQ PHY/QHP: CPT

## 2025-03-11 PROCEDURE — 85610 PROTHROMBIN TIME: CPT | Mod: QW

## 2025-03-11 RX ORDER — WARFARIN SODIUM 5 MG/1
TABLET ORAL
Qty: 180 TABLET | Refills: 3 | Status: SHIPPED | OUTPATIENT
Start: 2025-03-11

## 2025-03-11 NOTE — PROGRESS NOTES
Patient identification verified with 2 identifiers.     Location: [facility] - [address] [place], [place] [id number] [phone]      Referring Physician: James Ramicone, DO  Enrollment/ Re-enrollment date: 2026   INR Goal: 2.0-3.0  INR monitoring is per [facility] protocol.  Anticoagulation Medication: warfarin  Indication: Pulmonary Embolism (PE)  CBC drawn on February 15, 2025: H&H 15.7/47.7     Patient previously monitored via [facility] prior to transferring care to .    Subjective   Bleeding signs/symptoms: No    Bruising: No   Major bleeding event: No  Thrombosis signs/symptoms: No  Thromboembolic event: No  Missed doses: No  Extra doses: No  Medication changes: No  Dietary changes: No  Change in health: No  Change in activity: No  Alcohol: No  Other concerns: No    Upcoming Procedures:  Does the Patient Have any upcoming procedures that require interruption in anticoagulation therapy? no  Does the patient require bridging? no no Per Dr. Ramicone - patient recently underwent pacemaker implantation; however, does NOT require LMWH bridge therapy.       Anticoagulation Summary  As of 3/11/2025      INR goal:  2.0-3.0   TTR:  30.8% (1 d)   INR used for dosin.10 (3/11/2025)   Weekly warfarin total:  62.5 mg               Assessment/Plan   Therapeutic     1. New dose:  will MAINTAIN warfarin dose of 62.5mg/weekly; however, ADJUST dosing schedule to provide a more consistent amount of warfarin from day-to-day.     2. Next INR:  3-5 days      Education provided to patient during the visit:  Patient instructed to call in interim with questions, concerns and changes.   Patient educated on interactions between medications and warfarin.   Patient educated on dietary consistency in vitamin k consumption.   Patient educated on affects of alcohol consumption while taking warfarin.   Patient educated on signs of bleeding/clotting.   Patient educated on compliance with  dosing, follow up appointments, and prescribed plan of care.

## 2025-03-14 ENCOUNTER — ANTICOAGULATION - WARFARIN VISIT (OUTPATIENT)
Dept: CARDIOLOGY | Facility: CLINIC | Age: 70
End: 2025-03-14
Payer: MEDICARE

## 2025-03-14 DIAGNOSIS — Z79.01 LONG TERM (CURRENT) USE OF ANTICOAGULANTS: ICD-10-CM

## 2025-03-14 DIAGNOSIS — I26.99 PULMONARY EMBOLISM, UNSPECIFIED CHRONICITY, UNSPECIFIED PULMONARY EMBOLISM TYPE, UNSPECIFIED WHETHER ACUTE COR PULMONALE PRESENT (MULTI): Primary | ICD-10-CM

## 2025-03-14 LAB
POC INR: 2.5
POC PROTHROMBIN TIME: NORMAL

## 2025-03-14 PROCEDURE — 85610 PROTHROMBIN TIME: CPT | Mod: QW

## 2025-03-14 PROCEDURE — 99211 OFF/OP EST MAY X REQ PHY/QHP: CPT

## 2025-03-14 NOTE — PROGRESS NOTES
Patient identification verified with 2 identifiers.     Location: [facility] - [address] [place], [place] [id number] [phone]      Referring Physician: James Ramicone, DO  Enrollment/ Re-enrollment date: 2026   INR Goal: 2.0-3.0  INR monitoring is per [facility] protocol.  Anticoagulation Medication: warfarin  Indication: Pulmonary Embolism (PE)  CBC drawn on February 15, 2025: H&H 15.7/47.7     Patient previously monitored via [facility] prior to transferring care to .    Subjective   Bleeding signs/symptoms: No    Bruising: No   Major bleeding event: No  Thrombosis signs/symptoms: No  Thromboembolic event: No  Missed doses: No  Extra doses: No  Medication changes: No  Dietary changes: No  Change in health: No  Change in activity: No  Alcohol: No  Other concerns: No    Upcoming Procedures:  Does the Patient Have any upcoming procedures that require interruption in anticoagulation therapy? no  Does the patient require bridging? no Per Dr. Ramicone - patient recently underwent pacemaker implantation; however, does NOT require LMWH bridge therapy.       Anticoagulation Summary  As of 3/14/2025      INR goal:  2.0-3.0   TTR:  77.9% (4 d)   INR used for dosin.50 (3/14/2025)   Weekly warfarin total:  62.5 mg               Assessment/Plan   Therapeutic     1. New dose: no change    2. Next INR:  5-7 days      Education provided to patient during the visit:  Patient instructed to call in interim with questions, concerns and changes.   Patient educated on interactions between medications and warfarin.   Patient educated on dietary consistency in vitamin k consumption.   Patient educated on affects of alcohol consumption while taking warfarin.   Patient educated on signs of bleeding/clotting.   Patient educated on compliance with dosing, follow up appointments, and prescribed plan of care.

## 2025-03-19 ENCOUNTER — ANTICOAGULATION - WARFARIN VISIT (OUTPATIENT)
Dept: CARDIOLOGY | Facility: CLINIC | Age: 70
End: 2025-03-19
Payer: MEDICARE

## 2025-03-19 DIAGNOSIS — I26.99 PULMONARY EMBOLISM, UNSPECIFIED CHRONICITY, UNSPECIFIED PULMONARY EMBOLISM TYPE, UNSPECIFIED WHETHER ACUTE COR PULMONALE PRESENT (MULTI): Primary | ICD-10-CM

## 2025-03-19 DIAGNOSIS — Z79.01 LONG TERM (CURRENT) USE OF ANTICOAGULANTS: ICD-10-CM

## 2025-03-19 LAB
POC INR: 2.4
POC PROTHROMBIN TIME: NORMAL

## 2025-03-19 PROCEDURE — 99211 OFF/OP EST MAY X REQ PHY/QHP: CPT

## 2025-03-19 PROCEDURE — 85610 PROTHROMBIN TIME: CPT | Mod: QW

## 2025-03-19 NOTE — PROGRESS NOTES
Patient identification verified with 2 identifiers.    Location: [facility] - [address]  [place], [place] [address] [phone]      Referring Physician: James Ramicone, DO  Enrollment/ Re-enrollment date: 2026   INR Goal: 2.0-3.0  INR monitoring is per [facility] protocol.  Anticoagulation Medication: warfarin  Indication: Pulmonary Embolism (PE)  CBC drawn on February 15, 2025: H&H 15.7/47.7    Subjective   Bleeding signs/symptoms: No    Bruising: No   Major bleeding event: No  Thrombosis signs/symptoms: No  Thromboembolic event: No  Missed doses: No  Extra doses: No  Medication changes: No  Dietary changes: No  Change in health: No  Change in activity: No  Alcohol: No  Other concerns: No    Upcoming Procedures:  Does the Patient Have any upcoming procedures that require interruption in anticoagulation therapy? no  Does the patient require bridging? no      Anticoagulation Summary  As of 3/19/2025      INR goal:  2.0-3.0   TTR:  88.8% (1.3 wk)   INR used for dosin.40 (3/19/2025)   Weekly warfarin total:  62.5 mg               Assessment/Plan   Therapeutic     1. New dose: no change    2. Next INR: 2 weeks      Education provided to patient during the visit:  Patient instructed to call in interim with questions, concerns and changes.   Patient educated on compliance with dosing, follow up appointments, and prescribed plan of care.

## 2025-04-02 ENCOUNTER — HOSPITAL ENCOUNTER (OUTPATIENT)
Dept: CARDIOLOGY | Facility: CLINIC | Age: 70
Discharge: HOME | End: 2025-04-02
Payer: MEDICARE

## 2025-04-02 ENCOUNTER — ANTICOAGULATION - WARFARIN VISIT (OUTPATIENT)
Dept: CARDIOLOGY | Facility: CLINIC | Age: 70
End: 2025-04-02
Payer: MEDICARE

## 2025-04-02 DIAGNOSIS — Z79.01 LONG TERM (CURRENT) USE OF ANTICOAGULANTS: ICD-10-CM

## 2025-04-02 DIAGNOSIS — I26.99 PULMONARY EMBOLISM, UNSPECIFIED CHRONICITY, UNSPECIFIED PULMONARY EMBOLISM TYPE, UNSPECIFIED WHETHER ACUTE COR PULMONALE PRESENT (MULTI): Primary | ICD-10-CM

## 2025-04-02 DIAGNOSIS — I44.1 AV BLOCK, MOBITZ II: ICD-10-CM

## 2025-04-02 LAB
POC INR: 2
POC PROTHROMBIN TIME: NORMAL

## 2025-04-02 PROCEDURE — 93280 PM DEVICE PROGR EVAL DUAL: CPT

## 2025-04-02 PROCEDURE — 85610 PROTHROMBIN TIME: CPT | Mod: QW

## 2025-04-02 PROCEDURE — 99211 OFF/OP EST MAY X REQ PHY/QHP: CPT

## 2025-04-02 NOTE — PROGRESS NOTES
Patient identification verified with 2 identifiers.    Location: [facility] - [address]  [place], [place] [address] [phone]      Referring Physician: James Ramicone, DO  Enrollment/ Re-enrollment date: 2026   INR Goal: 2.0-3.0  INR monitoring is per [facility] protocol.  Anticoagulation Medication: warfarin  Indication: Pulmonary Embolism (PE)  CBC drawn on February 15, 2025: H&H 15.7/47.7    Subjective   Bleeding signs/symptoms: No    Bruising: No   Major bleeding event: No  Thrombosis signs/symptoms: No  Thromboembolic event: No  Missed doses: No  Extra doses: No  Medication changes: No  Dietary changes: No  Change in health: No  Change in activity: No  Alcohol: No  Other concerns: No    Upcoming Procedures:  Does the Patient Have any upcoming procedures that require interruption in anticoagulation therapy? no  Does the patient require bridging? no      Anticoagulation Summary  As of 2025      INR goal:  2.0-3.0   TTR:  95.6% (3.3 wk)   INR used for dosin.00 (2025)   Weekly warfarin total:  62.5 mg               Assessment/Plan   Therapeutic     1. New dose: no change    2. Next INR: 1 week      Education provided to patient during the visit:  Patient instructed to call in interim with questions, concerns and changes.   Patient educated on compliance with dosing, follow up appointments, and prescribed plan of care.

## 2025-04-11 ENCOUNTER — ANTICOAGULATION - WARFARIN VISIT (OUTPATIENT)
Dept: CARDIOLOGY | Facility: CLINIC | Age: 70
End: 2025-04-11
Payer: MEDICARE

## 2025-04-11 DIAGNOSIS — Z79.01 LONG TERM (CURRENT) USE OF ANTICOAGULANTS: ICD-10-CM

## 2025-04-11 DIAGNOSIS — I26.99 PULMONARY EMBOLISM, UNSPECIFIED CHRONICITY, UNSPECIFIED PULMONARY EMBOLISM TYPE, UNSPECIFIED WHETHER ACUTE COR PULMONALE PRESENT (MULTI): Primary | ICD-10-CM

## 2025-04-11 LAB
POC INR: 3.2
POC PROTHROMBIN TIME: NORMAL

## 2025-04-11 PROCEDURE — 99211 OFF/OP EST MAY X REQ PHY/QHP: CPT

## 2025-04-11 PROCEDURE — 85610 PROTHROMBIN TIME: CPT | Mod: QW

## 2025-04-11 NOTE — PROGRESS NOTES
Patient identification verified with 2 identifiers.    Location: [facility] - [address]  [place], [place] [address] [phone]      Referring Physician: James Ramicone, DO  Enrollment/ Re-enrollment date: February 25, 2026   INR Goal: 2.0-3.0  INR monitoring is per [facility] protocol.  Anticoagulation Medication: warfarin  Indication: Pulmonary Embolism (PE)  CBC drawn on February 15, 2025: H&H 15.7/47.7    Subjective   Bleeding signs/symptoms: No    Bruising: No   Major bleeding event: No  Thrombosis signs/symptoms: No  Thromboembolic event: No  Missed doses: No  Extra doses: No  Medication changes: No  Dietary changes: No  Change in health: No  Change in activity: No  Alcohol: No  Other concerns: No    Upcoming Procedures:  Does the Patient Have any upcoming procedures that require interruption in anticoagulation therapy? no  Does the patient require bridging? no      Anticoagulation Summary  As of 4/11/2025      INR goal:  2.0-3.0   TTR:  92.2% (1.1 mo)   INR used for dosing:  3.20 (4/11/2025)   Weekly warfarin total:  62.5 mg               Assessment/Plan   Supratherapeutic     1. New dose: no change    2. Next INR: 1 week      Education provided to patient during the visit:  Patient instructed to call in interim with questions, concerns and changes.   Patient educated on compliance with dosing, follow up appointments, and prescribed plan of care.

## 2025-04-18 ENCOUNTER — ANTICOAGULATION - WARFARIN VISIT (OUTPATIENT)
Dept: CARDIOLOGY | Facility: CLINIC | Age: 70
End: 2025-04-18
Payer: MEDICARE

## 2025-04-18 DIAGNOSIS — Z79.01 LONG TERM (CURRENT) USE OF ANTICOAGULANTS: ICD-10-CM

## 2025-04-18 DIAGNOSIS — I26.99 PULMONARY EMBOLISM, UNSPECIFIED CHRONICITY, UNSPECIFIED PULMONARY EMBOLISM TYPE, UNSPECIFIED WHETHER ACUTE COR PULMONALE PRESENT (MULTI): Primary | ICD-10-CM

## 2025-04-18 LAB
POC INR: 4.5 (ref 0.9–1.1)
POC PROTHROMBIN TIME: ABNORMAL (ref 9.3–12.5)

## 2025-04-18 PROCEDURE — 85610 PROTHROMBIN TIME: CPT | Mod: QW

## 2025-04-18 PROCEDURE — 99211 OFF/OP EST MAY X REQ PHY/QHP: CPT

## 2025-04-18 NOTE — PROGRESS NOTES
Patient identification verified with 2 identifiers.     Location: [facility] - [address] [place], [place] [id number] [phone]      Referring Physician: James Ramicone, DO  Enrollment/ Re-enrollment date: 2026   INR Goal: 2.0-3.0  INR monitoring is per [facility] protocol.  Anticoagulation Medication: warfarin  Indication: Pulmonary Embolism (PE)  CBC drawn on February 15, 2025: H&H 15.7/47.7     Patient previously monitored via [facility] prior to transferring care to .    Subjective   Bleeding signs/symptoms: No    Bruising: No   Major bleeding event: No  Thrombosis signs/symptoms: No  Thromboembolic event: No  Missed doses: No  Extra doses: No  Medication changes: No  Dietary changes: No  Change in health: No  Change in activity: No  Alcohol: No  Other concerns: No    Upcoming Procedures:  Does the Patient Have any upcoming procedures that require interruption in anticoagulation therapy? no  Does the patient require bridging? no    TWD of warfarin was MAINTAINED at time of last appointment one week ago.      Anticoagulation Summary  As of 2025      INR goal:  2.0-3.0   TTR:  74.8% (1.3 mo)   INR used for dosin.50 (2025)   Weekly warfarin total:  55 mg               Assessment/Plan   Supratherapeutic   NO identifiable cause for elevation in INR.  1. New dose:  will HOLD two doses and REDUCE TWD of warfarin by 7.5mg (approx. 10-15%).     2. Next INR: 1 week      Education provided to patient during the visit:  Patient instructed to call in interim with questions, concerns and changes.   Patient educated on interactions between medications and warfarin.   Patient educated on dietary consistency in vitamin k consumption.   Patient educated on affects of alcohol consumption while taking warfarin.   Patient educated on signs of bleeding/clotting.   Patient educated on compliance with dosing, follow up appointments, and prescribed plan of care.

## 2025-04-25 ENCOUNTER — ANTICOAGULATION - WARFARIN VISIT (OUTPATIENT)
Dept: CARDIOLOGY | Facility: CLINIC | Age: 70
End: 2025-04-25
Payer: MEDICARE

## 2025-04-25 DIAGNOSIS — Z79.01 LONG TERM (CURRENT) USE OF ANTICOAGULANTS: ICD-10-CM

## 2025-04-25 DIAGNOSIS — I26.99 PULMONARY EMBOLISM, UNSPECIFIED CHRONICITY, UNSPECIFIED PULMONARY EMBOLISM TYPE, UNSPECIFIED WHETHER ACUTE COR PULMONALE PRESENT (MULTI): Primary | ICD-10-CM

## 2025-04-25 LAB
POC INR: 1.5 (ref 0.9–1.1)
POC PROTHROMBIN TIME: ABNORMAL (ref 9.3–12.5)

## 2025-04-25 PROCEDURE — 85610 PROTHROMBIN TIME: CPT | Mod: QW

## 2025-04-25 PROCEDURE — 99211 OFF/OP EST MAY X REQ PHY/QHP: CPT

## 2025-04-25 NOTE — PROGRESS NOTES
Patient identification verified with 2 identifiers.     Location: [facility] - [address] [place], [place] [address] [phone]      Referring Physician: James Ramicone, DO  Enrollment/ Re-enrollment date: 2026   INR Goal: 2.0-3.0  INR monitoring is per [facility] protocol.  Anticoagulation Medication: warfarin  Indication: Pulmonary Embolism (PE)    Subjective   Bleeding signs/symptoms: No    Bruising: Yes   Major bleeding event: No  Thrombosis signs/symptoms: No  Thromboembolic event: No  Missed doses: No  Denies  Extra doses: No  Medication changes: No  Denies  Dietary changes: No  Denies  Change in health: No  Change in activity: No  Alcohol: No  Other concerns: No    Upcoming Procedures:  Does the Patient Have any upcoming procedures that require interruption in anticoagulation therapy? no  Does the patient require bridging? no      Anticoagulation Summary  As of 2025      INR goal:  2.0-3.0   TTR:  68.5% (1.5 mo)   INR used for dosin.50 (2025)   Weekly warfarin total:  60 mg               Assessment/Plan   Subtherapeutic    1. New dose: Will increase dose and patient will return in 1 week.    2. Next INR: 1 week      Education provided to patient during the visit:  Patient instructed to call in interim with questions, concerns and changes.

## 2025-04-30 PROBLEM — Z86.73 HISTORY OF CEREBROVASCULAR ACCIDENT: Status: ACTIVE | Noted: 2025-04-30

## 2025-04-30 PROBLEM — Z98.890 STATUS POST LUMBAR SPINE SURGERY FOR DECOMPRESSION OF SPINAL CORD: Status: RESOLVED | Noted: 2023-02-15 | Resolved: 2025-04-30

## 2025-04-30 PROBLEM — Z86.711 HISTORY OF PULMONARY EMBOLISM: Status: ACTIVE | Noted: 2024-12-05

## 2025-04-30 PROBLEM — I26.99 PULMONARY EMBOLI: Status: ACTIVE | Noted: 2025-01-22

## 2025-04-30 PROBLEM — R09.89 CHEST CONGESTION: Status: RESOLVED | Noted: 2023-02-15 | Resolved: 2025-04-30

## 2025-04-30 PROBLEM — H47.011 NAION (NON-ARTERITIC ANTERIOR ISCHEMIC OPTIC NEUROPATHY), RIGHT EYE: Status: RESOLVED | Noted: 2023-02-15 | Resolved: 2025-04-30

## 2025-04-30 PROBLEM — M77.8 TENDINITIS OF LEFT ELBOW: Status: RESOLVED | Noted: 2023-02-15 | Resolved: 2025-04-30

## 2025-04-30 PROBLEM — M76.62 ACHILLES TENDINITIS OF LEFT LOWER EXTREMITY: Status: RESOLVED | Noted: 2023-07-11 | Resolved: 2025-04-30

## 2025-04-30 PROBLEM — N48.6 PEYRONIE DISEASE: Status: RESOLVED | Noted: 2023-02-15 | Resolved: 2025-04-30

## 2025-04-30 PROBLEM — R09.89 RUNNY NOSE: Status: RESOLVED | Noted: 2023-02-15 | Resolved: 2025-04-30

## 2025-04-30 PROBLEM — S29.019A ACUTE THORACIC MYOFASCIAL STRAIN: Status: RESOLVED | Noted: 2023-02-15 | Resolved: 2025-04-30

## 2025-04-30 PROBLEM — N41.9 PROSTATITIS: Status: RESOLVED | Noted: 2023-02-15 | Resolved: 2025-04-30

## 2025-04-30 PROBLEM — H01.009 BLEPHARITIS: Status: RESOLVED | Noted: 2023-02-15 | Resolved: 2025-04-30

## 2025-04-30 PROBLEM — M62.838 MUSCLE SPASMS OF NECK: Status: RESOLVED | Noted: 2023-02-15 | Resolved: 2025-04-30

## 2025-04-30 PROBLEM — M54.16 LUMBAR RADICULITIS: Status: RESOLVED | Noted: 2023-02-15 | Resolved: 2025-04-30

## 2025-04-30 PROBLEM — L91.8 CUTANEOUS SKIN TAGS: Status: RESOLVED | Noted: 2023-02-15 | Resolved: 2025-04-30

## 2025-04-30 PROBLEM — H01.005 BLEPHARITIS OF LEFT LOWER EYELID: Status: RESOLVED | Noted: 2023-02-15 | Resolved: 2025-04-30

## 2025-04-30 PROBLEM — M48.061 SPINAL STENOSIS OF LUMBAR REGION: Status: RESOLVED | Noted: 2023-02-15 | Resolved: 2025-04-30

## 2025-04-30 PROBLEM — M54.17 LUMBOSACRAL NEURITIS: Status: RESOLVED | Noted: 2023-02-15 | Resolved: 2025-04-30

## 2025-04-30 PROBLEM — S39.011A STRAIN OF ABDOMINAL WALL: Status: RESOLVED | Noted: 2023-02-15 | Resolved: 2025-04-30

## 2025-04-30 PROBLEM — M77.8 THUMB TENDONITIS: Status: RESOLVED | Noted: 2023-02-15 | Resolved: 2025-04-30

## 2025-04-30 PROBLEM — S39.012A STRAIN, LUMBOSACRAL: Status: RESOLVED | Noted: 2023-02-15 | Resolved: 2025-04-30

## 2025-04-30 PROBLEM — H47.20 OPTIC ATROPHY OF RIGHT EYE: Status: RESOLVED | Noted: 2023-02-15 | Resolved: 2025-04-30

## 2025-04-30 PROBLEM — L23.9 ALLERGIC DERMATITIS: Status: RESOLVED | Noted: 2023-02-15 | Resolved: 2025-04-30

## 2025-05-02 ENCOUNTER — ANTICOAGULATION - WARFARIN VISIT (OUTPATIENT)
Dept: CARDIOLOGY | Facility: CLINIC | Age: 70
End: 2025-05-02
Payer: MEDICARE

## 2025-05-02 DIAGNOSIS — Z79.01 LONG TERM (CURRENT) USE OF ANTICOAGULANTS: ICD-10-CM

## 2025-05-02 DIAGNOSIS — I26.99 PULMONARY EMBOLISM, UNSPECIFIED CHRONICITY, UNSPECIFIED PULMONARY EMBOLISM TYPE, UNSPECIFIED WHETHER ACUTE COR PULMONALE PRESENT (MULTI): Primary | ICD-10-CM

## 2025-05-02 LAB
POC INR: 2.4 (ref 0.9–1.1)
POC PROTHROMBIN TIME: ABNORMAL (ref 9.3–12.5)

## 2025-05-02 PROCEDURE — 99211 OFF/OP EST MAY X REQ PHY/QHP: CPT

## 2025-05-02 PROCEDURE — 85610 PROTHROMBIN TIME: CPT | Mod: QW

## 2025-05-02 NOTE — PROGRESS NOTES
Patient identification verified with 2 identifiers.     Location: [facility] - [address] [place], [place] [address] [phone]      Referring Physician: James Ramicone, DO  Enrollment/ Re-enrollment date: 2026   INR Goal: 2.0-3.0  INR monitoring is per [facility] protocol.  Anticoagulation Medication: warfarin  Indication: Pulmonary Embolism (PE)    Subjective   Bleeding signs/symptoms: No    Bruising: No   Major bleeding event: No  Thrombosis signs/symptoms: No  Thromboembolic event: No  Missed doses: No  Extra doses: No  Medication changes: No  Dietary changes: No  Change in health: No  Change in activity: No  Alcohol: No  Other concerns: No    Upcoming Procedures:  Does the Patient Have any upcoming procedures that require interruption in anticoagulation therapy? no  Does the patient require bridging? no      Anticoagulation Summary  As of 2025      INR goal:  2.0-3.0   TTR:  65.4% (1.8 mo)   INR used for dosin.40 (2025)   Weekly warfarin total:  60 mg               Assessment/Plan   Therapeutic     1. New dose: Will maintain dose and patient will return in 1 week.    2. Next INR: 1 week      Education provided to patient during the visit:  Patient instructed to call in interim with questions, concerns and changes.

## 2025-05-03 ASSESSMENT — ENCOUNTER SYMPTOMS
DIZZINESS: 0
SPEECH DIFFICULTY: 0
TREMORS: 0
SWEATS: 0
CONFUSION: 0
BLACKOUTS: 0
SEIZURES: 0
NERVOUS/ANXIOUS: 0
HEADACHES: 0
HUNGER: 0
FATIGUE: 1
WEAKNESS: 1

## 2025-05-05 ENCOUNTER — APPOINTMENT (OUTPATIENT)
Dept: PRIMARY CARE | Facility: CLINIC | Age: 70
End: 2025-05-05
Payer: MEDICARE

## 2025-05-05 VITALS
DIASTOLIC BLOOD PRESSURE: 64 MMHG | TEMPERATURE: 97.9 F | RESPIRATION RATE: 18 BRPM | HEIGHT: 67 IN | SYSTOLIC BLOOD PRESSURE: 120 MMHG | OXYGEN SATURATION: 94 % | WEIGHT: 254 LBS | BODY MASS INDEX: 39.87 KG/M2 | HEART RATE: 60 BPM

## 2025-05-05 DIAGNOSIS — E11.9 CONTROLLED TYPE 2 DIABETES MELLITUS WITHOUT COMPLICATION, WITHOUT LONG-TERM CURRENT USE OF INSULIN: Primary | ICD-10-CM

## 2025-05-05 DIAGNOSIS — H54.61 VISION LOSS OF RIGHT EYE: ICD-10-CM

## 2025-05-05 DIAGNOSIS — M47.816 LUMBAR SPONDYLOSIS: ICD-10-CM

## 2025-05-05 DIAGNOSIS — Z86.711 HISTORY OF PULMONARY EMBOLISM: ICD-10-CM

## 2025-05-05 DIAGNOSIS — G47.33 OBSTRUCTIVE SLEEP APNEA: ICD-10-CM

## 2025-05-05 DIAGNOSIS — H47.011 NON-ARTERITIC ANTERIOR ISCHEMIC OPTIC NEUROPATHY OF RIGHT EYE: ICD-10-CM

## 2025-05-05 DIAGNOSIS — Z95.0 PACEMAKER: ICD-10-CM

## 2025-05-05 LAB
HBA1C MFR BLD: 6.1 % (ref 4.2–6.5)
POC FINGERSTICK BLOOD GLUCOSE: 129 MG/DL (ref 70–100)
POC INR: 2.2 (ref 0.9–1.1)

## 2025-05-05 PROCEDURE — 1036F TOBACCO NON-USER: CPT | Performed by: FAMILY MEDICINE

## 2025-05-05 PROCEDURE — 3008F BODY MASS INDEX DOCD: CPT | Performed by: FAMILY MEDICINE

## 2025-05-05 PROCEDURE — 3074F SYST BP LT 130 MM HG: CPT | Performed by: FAMILY MEDICINE

## 2025-05-05 PROCEDURE — 3078F DIAST BP <80 MM HG: CPT | Performed by: FAMILY MEDICINE

## 2025-05-05 PROCEDURE — 83036 HEMOGLOBIN GLYCOSYLATED A1C: CPT | Mod: CLIA WAIVED TEST | Performed by: FAMILY MEDICINE

## 2025-05-05 PROCEDURE — 1126F AMNT PAIN NOTED NONE PRSNT: CPT | Performed by: FAMILY MEDICINE

## 2025-05-05 PROCEDURE — 3044F HG A1C LEVEL LT 7.0%: CPT | Performed by: FAMILY MEDICINE

## 2025-05-05 PROCEDURE — 1159F MED LIST DOCD IN RCRD: CPT | Performed by: FAMILY MEDICINE

## 2025-05-05 PROCEDURE — 1160F RVW MEDS BY RX/DR IN RCRD: CPT | Performed by: FAMILY MEDICINE

## 2025-05-05 PROCEDURE — 82962 GLUCOSE BLOOD TEST: CPT | Performed by: FAMILY MEDICINE

## 2025-05-05 PROCEDURE — 85610 PROTHROMBIN TIME: CPT | Performed by: FAMILY MEDICINE

## 2025-05-05 PROCEDURE — 99214 OFFICE O/P EST MOD 30 MIN: CPT | Performed by: FAMILY MEDICINE

## 2025-05-05 PROCEDURE — 1123F ACP DISCUSS/DSCN MKR DOCD: CPT | Performed by: FAMILY MEDICINE

## 2025-05-05 ASSESSMENT — PATIENT HEALTH QUESTIONNAIRE - PHQ9
2. FEELING DOWN, DEPRESSED OR HOPELESS: NOT AT ALL
1. LITTLE INTEREST OR PLEASURE IN DOING THINGS: NOT AT ALL
SUM OF ALL RESPONSES TO PHQ9 QUESTIONS 1 AND 2: 0

## 2025-05-05 ASSESSMENT — PAIN SCALES - GENERAL: PAINLEVEL_OUTOF10: 0-NO PAIN

## 2025-05-05 NOTE — PROGRESS NOTES
Subjective   Rehan Holman is a 69 y.o. male who presents for Follow-up (Follow up visit).    HPI :  Patient is a 69 year old Diabetic male in for recheck on blood sugar and A1c, and follow up on medication review.  Patient also has a New Pacemaker and is feeling better.   Chronic back problems. Follows with  Coumadin clinic  for medication management. Has Cardiology appointment in 2 weeks. Still with back pains  and peripheral neuropathy.     Objective  : ROS : 10 systems were reviewed and the information is included in the HPI and no additional review of systems is indicated.    Physical Exam  Vitals and nursing note reviewed.   Constitutional:       Appearance: Normal appearance. He is obese.      Comments: Patient is alert and oriented x3.   No acute distress   HENT:      Head: Normocephalic.      Right Ear: Tympanic membrane and external ear normal.      Left Ear: Tympanic membrane and external ear normal.      Ears:      Comments: Ears are patent bilaterally and TMs are clear.     Nose: Nose normal.      Mouth/Throat:      Mouth: Mucous membranes are moist.      Pharynx: Oropharynx is clear.      Comments: Mouth is moist, tongue is midline.  No posterior pharyngeal erythema.  Eyes:      Extraocular Movements: Extraocular movements intact.      Conjunctiva/sclera: Conjunctivae normal.      Pupils: Pupils are equal, round, and reactive to light.      Comments: NAION  of right eye.  Follows with a retinal doctor.   Neck:      Comments: No carotid bruits, no thyromegaly, no cervical adenopathy.  Occasional neck spasm and restriction of motion secondary to stress and tension.  Cardiovascular:      Rate and Rhythm: Normal rate and regular rhythm.      Pulses: Normal pulses.      Heart sounds: Normal heart sounds.      Comments: Patient does have a pacemaker and is stable. Follows with Coumadin clinic.   Patient denies chest pain and no palpitations.  Heart rhythm is stable S1 and S2 are noted.  Pulmonary:      Effort:  Pulmonary effort is normal.      Comments: Patient does have a history of pulmonary emboli and is on his blood thinner.  Patient denies any coughing or wheezing.  Lungs are clear to auscultation.    Abdominal:      General: Bowel sounds are normal.      Palpations: Abdomen is soft.      Comments: Abdomen is soft and mildly obese but non tender.  No flank tenderness.  No suprapubic pain.  Positive bowel sounds .Weight down from 280 lbs.   No abdominal guarding and no rebound tenderness.   Genitourinary:     Comments: Patient denies dysuria, no hematuria, no nocturia, denies flank pain.  Musculoskeletal:         General: Tenderness present. Normal range of motion.      Cervical back: Normal range of motion and neck supple.      Comments: Patient has had 2 previous back operations and does have chronic lumbosacral disc disease.  Osteoarthritis of the hips and knees.  Age-related arthritis in the joints. Restriction of motion cervical and lumbar spines due to  arthritis , and previous back surgeries.    Skin:     General: Skin is warm.      Comments: There is no bruising, no erythema, no skin lesions noted, no rashes.   Neurological:      General: No focal deficit present.      Mental Status: He is alert and oriented to person, place, and time. Mental status is at baseline.      Sensory: Sensory deficit present.      Comments: No focal neurosensory deficits are noted.  Patient has mild diabetic peripheral neuropathy.  Coordination and gait are stable.  Normal muscle strength upper and lower extremities.   Psychiatric:         Behavior: Behavior normal.         Thought Content: Thought content normal.         Judgment: Judgment normal.      Comments: Patient has normal mood and affect.  Thought content and judgment are stable.  No signs of vascular dementia.       PLAN : Patient is a 69-year-old diabetic male in for recheck on his blood sugar and A1c, he also has a new pacemaker and is on Coumadin  for a previous   pulmonary embolus.  Since patient had the pacemaker placed he states he is feeling better and has more energy.  He does follow at the Coumadin clinic in [place].  He has an appointment with cardiology in 2 weeks.  Today his blood sugar was 129 and his A1c was stable at 6.1%, Coumadin INR was also stable at 2.2%.   Patient will continue to follow his diet and follow-up in this office in 3 to 4 months.  Blood pressure and other vitals were stable.    Problem List Items Addressed This Visit    None           Yadiel Malone,

## 2025-05-09 ENCOUNTER — ANTICOAGULATION - WARFARIN VISIT (OUTPATIENT)
Dept: CARDIOLOGY | Facility: CLINIC | Age: 70
End: 2025-05-09
Payer: MEDICARE

## 2025-05-09 DIAGNOSIS — Z79.01 LONG TERM (CURRENT) USE OF ANTICOAGULANTS: ICD-10-CM

## 2025-05-09 DIAGNOSIS — I26.99 PULMONARY EMBOLISM, UNSPECIFIED CHRONICITY, UNSPECIFIED PULMONARY EMBOLISM TYPE, UNSPECIFIED WHETHER ACUTE COR PULMONALE PRESENT (MULTI): Primary | ICD-10-CM

## 2025-05-09 LAB
POC INR: 3.4 (ref 0.9–1.1)
POC PROTHROMBIN TIME: ABNORMAL (ref 9.3–12.5)

## 2025-05-09 PROCEDURE — 85610 PROTHROMBIN TIME: CPT | Mod: QW

## 2025-05-09 PROCEDURE — 99211 OFF/OP EST MAY X REQ PHY/QHP: CPT

## 2025-05-09 NOTE — PROGRESS NOTES
Patient identification verified with 2 identifiers.     Location: [facility] - [address] [place], [place] [address] [phone]      Referring Physician: James Ramicone, DO  Enrollment/ Re-enrollment date: February 25, 2026   INR Goal: 2.0-3.0  INR monitoring is per [facility] protocol.  Anticoagulation Medication: warfarin  Indication: Pulmonary Embolism (PE)    Subjective   Bleeding signs/symptoms: No    Bruising: No   Major bleeding event: No  Thrombosis signs/symptoms: No  Thromboembolic event: No  Missed doses: No  Extra doses: No  Medication changes: No  Dietary changes: No  Change in health: No  Change in activity: No  Alcohol: No  Other concerns: No    Upcoming Procedures:  Does the Patient Have any upcoming procedures that require interruption in anticoagulation therapy? no  Does the patient require bridging? no      Anticoagulation Summary  As of 5/9/2025      INR goal:  2.0-3.0   TTR:  67.2% (2 mo)   INR used for dosing:  3.40 (5/9/2025)   Weekly warfarin total:  57.5 mg               Assessment/Plan   Supra therapeutic    1. New dose: Will decrease dose and patient will return in 1 week.    2. Next INR: 1 week      Education provided to patient during the visit:  Patient instructed to call in interim with questions, concerns and changes.

## 2025-05-11 DIAGNOSIS — E78.5 DYSLIPIDEMIA: ICD-10-CM

## 2025-05-11 DIAGNOSIS — E13.9 DIABETES 1.5, MANAGED AS TYPE 2 (MULTI): ICD-10-CM

## 2025-05-13 RX ORDER — SIMVASTATIN 10 MG/1
10 TABLET, FILM COATED ORAL DAILY
Qty: 100 TABLET | Refills: 2 | Status: SHIPPED | OUTPATIENT
Start: 2025-05-13

## 2025-05-13 RX ORDER — GLIMEPIRIDE 4 MG/1
4 TABLET ORAL
Qty: 100 TABLET | Refills: 3 | Status: SHIPPED | OUTPATIENT
Start: 2025-05-13

## 2025-05-14 ENCOUNTER — OFFICE VISIT (OUTPATIENT)
Dept: CARDIOLOGY | Facility: CLINIC | Age: 70
End: 2025-05-14
Payer: MEDICARE

## 2025-05-14 ENCOUNTER — ANTICOAGULATION - WARFARIN VISIT (OUTPATIENT)
Dept: CARDIOLOGY | Facility: CLINIC | Age: 70
End: 2025-05-14
Payer: MEDICARE

## 2025-05-14 VITALS
WEIGHT: 252 LBS | SYSTOLIC BLOOD PRESSURE: 138 MMHG | DIASTOLIC BLOOD PRESSURE: 84 MMHG | HEART RATE: 59 BPM | OXYGEN SATURATION: 98 % | HEIGHT: 74 IN | BODY MASS INDEX: 32.34 KG/M2

## 2025-05-14 DIAGNOSIS — Z95.0 PACEMAKER: Primary | ICD-10-CM

## 2025-05-14 DIAGNOSIS — Z79.01 LONG TERM (CURRENT) USE OF ANTICOAGULANTS: ICD-10-CM

## 2025-05-14 DIAGNOSIS — I10 PRIMARY HYPERTENSION: ICD-10-CM

## 2025-05-14 DIAGNOSIS — I44.1 AV BLOCK, MOBITZ II: ICD-10-CM

## 2025-05-14 DIAGNOSIS — I26.99 PULMONARY EMBOLISM, UNSPECIFIED CHRONICITY, UNSPECIFIED PULMONARY EMBOLISM TYPE, UNSPECIFIED WHETHER ACUTE COR PULMONALE PRESENT (MULTI): Primary | ICD-10-CM

## 2025-05-14 PROBLEM — E66.812 CLASS 2 OBESITY WITH BODY MASS INDEX (BMI) OF 39.0 TO 39.9 IN ADULT: Status: ACTIVE | Noted: 2023-10-11

## 2025-05-14 LAB
POC INR: 2.5 (ref 0.9–1.1)
POC PROTHROMBIN TIME: ABNORMAL (ref 9.3–12.5)

## 2025-05-14 PROCEDURE — 3008F BODY MASS INDEX DOCD: CPT | Performed by: INTERNAL MEDICINE

## 2025-05-14 PROCEDURE — 1159F MED LIST DOCD IN RCRD: CPT | Performed by: INTERNAL MEDICINE

## 2025-05-14 PROCEDURE — 99213 OFFICE O/P EST LOW 20 MIN: CPT | Performed by: INTERNAL MEDICINE

## 2025-05-14 PROCEDURE — 3079F DIAST BP 80-89 MM HG: CPT | Performed by: INTERNAL MEDICINE

## 2025-05-14 PROCEDURE — 1126F AMNT PAIN NOTED NONE PRSNT: CPT | Performed by: INTERNAL MEDICINE

## 2025-05-14 PROCEDURE — 3044F HG A1C LEVEL LT 7.0%: CPT | Performed by: INTERNAL MEDICINE

## 2025-05-14 PROCEDURE — 85610 PROTHROMBIN TIME: CPT | Mod: QW

## 2025-05-14 PROCEDURE — 3075F SYST BP GE 130 - 139MM HG: CPT | Performed by: INTERNAL MEDICINE

## 2025-05-14 PROCEDURE — 99024 POSTOP FOLLOW-UP VISIT: CPT | Performed by: INTERNAL MEDICINE

## 2025-05-14 PROCEDURE — 99211 OFF/OP EST MAY X REQ PHY/QHP: CPT

## 2025-05-14 ASSESSMENT — PAIN SCALES - GENERAL: PAINLEVEL_OUTOF10: 0-NO PAIN

## 2025-05-14 NOTE — PATIENT INSTRUCTIONS
Available blood thinners other than warfarin and Eliquis:  Xarelto 20 mg daily    Pradaxa 150 mg twice daily    Follow up with Dr Ramicone in 10-12 months

## 2025-05-14 NOTE — PROGRESS NOTES
Patient identification verified with 2 identifiers.    Location: [facility] - [address] [place], [place] [address] [phone]      Referring Physician: James Ramicone, DO  Enrollment/ Re-enrollment date: 2026   INR Goal: 2.0-3.0  INR monitoring is per [facility] protocol.  Anticoagulation Medication: warfarin  Indication: Pulmonary Embolism (PE)    Subjective   Bleeding signs/symptoms: No    Bruising: No   Major bleeding event: No  Thrombosis signs/symptoms: No  Thromboembolic event: No  Missed doses: No  Extra doses: No  Medication changes: No  Dietary changes: No  Change in health: No  Change in activity: No  Alcohol: No  Other concerns: No    Upcoming Procedures:  Does the Patient Have any upcoming procedures that require interruption in anticoagulation therapy? no  Does the patient require bridging? no      Anticoagulation Summary  As of 2025      INR goal:  2.0-3.0   TTR:  66.3% (2.2 mo)   INR used for dosin.50 (2025)   Weekly warfarin total:  57.5 mg               Assessment/Plan   Therapeutic     1. New dose: no change    2. Next INR: 1 week      Education provided to patient during the visit:  Patient instructed to call in interim with questions, concerns and changes.   Patient educated on compliance with dosing, follow up appointments, and prescribed plan of care.

## 2025-05-14 NOTE — PROGRESS NOTES
"    History Of Present Illness:      This is a 70-year-old male with a history of Mobitz 2 AV block.  He presents for a follow-up evaluation after undergoing a pacemaker insertion.  He underwent insertion of a [employer] dual-chamber permanent pacemaker on February 14, 2025.  He has been feeling very well since the pacemaker was implanted.  Prior to the device insertion he was very fatigued with shortness of breath on exertion and felt dizzy at times.    Review of Systems  Other review of systems negative  Last Recorded Vitals:      2/14/2025    10:39 PM 2/14/2025    11:35 PM 2/15/2025     2:59 AM 2/15/2025     3:11 AM 2/15/2025     7:40 AM 2/15/2025    11:39 AM 5/5/2025    10:10 AM   Vitals   Systolic  140  131 121 135 120   Diastolic  77  71 80 63 64   BP Location  Right arm  Right arm Right arm Right arm    Heart Rate  65  63 81 71 60   Temp  36.5 °C (97.7 °F)  36.3 °C (97.3 °F) 35.2 °C (95.4 °F) 36 °C (96.8 °F) 36.6 °C (97.9 °F)   Resp 21 17 17 18   18   Height       1.702 m (5' 7\")   Weight (lb)       254   BMI       39.78 kg/m2   BSA (m2)       2.33 m2   Visit Report       Report     Allergies:  Patient has no known allergies.  Outpatient Medications:  Current Outpatient Medications   Medication Instructions    amLODIPine-benazepriL (Lotrel) 5-40 mg capsule 1 capsule, oral, Daily    aspirin 81 mg, Daily    cholecalciferol (VITAMIN D-3) 25 mcg, Daily    FLUoxetine (PROzac) 40 mg capsule TAKE 2 CAPSULES BY MOUTH DAILY    gabapentin (Neurontin) 300 mg capsule TAKE 1 CAPSULE BY MOUTH TWICE  DAILY    glimepiride (AMARYL) 4 mg, oral, Daily before breakfast    metoprolol succinate XL (TOPROL-XL) 50 mg, oral, Daily    multivitamin tablet 1 tablet, Daily    pantoprazole (PROTONIX) 40 mg, oral, Daily    simvastatin (ZOCOR) 10 mg, oral, Daily    warfarin (Coumadin) 5 mg tablet Take two tablets by mouth once a day or as directed by  Coumadin Clinic       Physical Exam:    General Appearance:  Alert, oriented, no " distress  Skin:  Warm and dry  Head and Neck:  No elevation of JVP, no carotid bruits  Cardiac Exam:  Rhythm is regular, S1 and S2 are normal, no murmur S3 or S4  Lungs:  Clear to auscultation  Extremities:  no edema  Neurologic:  No focal deficits  Psychiatric:  Appropriate mood and behavior    Lab Results:    CMP:  Recent Labs     02/15/25  0848 02/12/25  1656 10/02/24  1023 07/10/24  1052 01/11/24  1114 07/11/23  1108 12/20/22  0724 06/16/22  1907   * 139 139 137 135* 138 135* 137   K 3.8 3.9 4.3 4.3 4.6 4.3 4.7 4.4   CL 98 106 101 100 99 102 98 100   CO2 21 25 30 26 27 26 29 28   ANIONGAP 17 12 12 15 14 14 13 13   BUN 20 13 13 11 13 15 19 14   CREATININE 1.01 1.02 0.74 0.75 0.78 0.77 1.01 0.87   EGFR 81 80 >90 >90 >90  --   --   --    MG  --  1.84  --   --   --   --   --   --      Recent Labs     02/15/25  0848 02/12/25  1656 10/02/24  1023 07/10/24  1052 01/11/24  1114   ALBUMIN 4.6 4.2 4.4 4.4 4.6   ALKPHOS 65 57 58 55 55   ALT 25 30 39 31 33   AST 17 19 28 21 23   BILITOT 1.5* 0.7 1.1 1.1 1.4*     CBC:  Recent Labs     02/15/25  0848 02/12/25  1656 10/02/24  1023 07/10/24  1052 01/11/24  1114 07/11/23  1108 12/20/22  0724 06/16/22  1907   WBC 9.7 8.2 6.3 6.6 6.3 6.7 8.6 6.9   HGB 15.7 14.0 14.7 14.9 15.4 14.0 15.2 14.5   HCT 47.7 42.0 45.1 46.7 46.5 43.9 47.5 43.8    247 272 278 265 258 271 242   MCV 91 92 92 95 91 94 94 93     COAG:   Recent Labs     05/09/25  1018 05/05/25  1149 05/02/25  0957 04/25/25  1016 04/18/25  1021 04/11/25  0000 04/02/25  0000 03/19/25  0000   INR 3.40* 2.2* 2.40* 1.50* 4.50* 3.20 2.00 2.40     Cardiology Tests (personally reviewed):    Telemetry: Sinus rhythm with periods of 2-1 AV block  Echocardiogram February 13, 2025: Ejection fraction 55 to 60%    Assessment/Plan   Problem List Items Addressed This Visit           ICD-10-CM    Hypertension I10    Blood pressure has been under reasonable control on the amlodipine and benazepril.         AV block, Mobitz II I44.1     The patient underwent insertion of a permanent pacemaker in February 2025 for treatment of symptomatic Mobitz 2 AV block.  The pacemaker is functioning appropriately.  The incision site is well-healed and there is no sign of infection.  Continue follow-up through the cardiac device clinic.         Long term (current) use of anticoagulants Z79.01    The patient is going to consider using Eliquis instead of warfarin.         Pacemaker - Primary Z95.0                James C Ramicone, DO

## 2025-05-16 ENCOUNTER — APPOINTMENT (OUTPATIENT)
Dept: CARDIOLOGY | Facility: CLINIC | Age: 70
End: 2025-05-16
Payer: MEDICARE

## 2025-05-16 NOTE — ASSESSMENT & PLAN NOTE
The patient underwent insertion of a permanent pacemaker in February 2025 for treatment of symptomatic Mobitz 2 AV block.  The pacemaker is functioning appropriately.  The incision site is well-healed and there is no sign of infection.  Continue follow-up through the cardiac device clinic.

## 2025-05-23 ENCOUNTER — ANTICOAGULATION - WARFARIN VISIT (OUTPATIENT)
Dept: CARDIOLOGY | Facility: CLINIC | Age: 70
End: 2025-05-23
Payer: MEDICARE

## 2025-05-23 DIAGNOSIS — I26.99 PULMONARY EMBOLISM, UNSPECIFIED CHRONICITY, UNSPECIFIED PULMONARY EMBOLISM TYPE, UNSPECIFIED WHETHER ACUTE COR PULMONALE PRESENT (MULTI): Primary | ICD-10-CM

## 2025-05-23 DIAGNOSIS — Z79.01 LONG TERM (CURRENT) USE OF ANTICOAGULANTS: ICD-10-CM

## 2025-05-23 LAB
POC INR: 3.3 (ref 0.9–1.1)
POC PROTHROMBIN TIME: ABNORMAL (ref 9.3–12.5)

## 2025-05-23 PROCEDURE — 85610 PROTHROMBIN TIME: CPT | Mod: QW

## 2025-05-23 PROCEDURE — 99211 OFF/OP EST MAY X REQ PHY/QHP: CPT

## 2025-05-23 NOTE — PROGRESS NOTES
Patient identification verified with 2 identifiers.    Location: [facility] - [address] [place], [place] [id number] [phone]     Referring Physician: James C Ramicone, DO   Enrollment/ Re-enrollment date: 2/25/2026   INR Goal: 2.0-3.0  INR monitoring is per [facility] protocol.  Anticoagulation Medication: warfarin  Indication: Pulmonary Embolism (PE)    Subjective   Bleeding signs/symptoms: No    Bruising: No   Major bleeding event: No  Thrombosis signs/symptoms: No  Thromboembolic event: No  Missed doses: No  Extra doses: No  Medication changes: No  Dietary changes: No  Change in health: No  Change in activity: No  Alcohol: No  Other concerns: No    Upcoming Procedures:  Does the Patient Have any upcoming procedures that require interruption in anticoagulation therapy? no  Does the patient require bridging? no      Anticoagulation Summary  As of 5/23/2025      INR goal:  2.0-3.0   TTR:  65.9% (2.5 mo)   INR used for dosing:  3.30 (5/23/2025)   Weekly warfarin total:  55 mg               Assessment/Plan   Supratherapeutic     1. New dose: dose reduced    2. Next INR: 1 week      Education provided to patient during the visit:  Patient instructed to call in interim with questions, concerns and changes.

## 2025-05-29 ENCOUNTER — TELEPHONE (OUTPATIENT)
Dept: CARDIOLOGY | Facility: CLINIC | Age: 70
End: 2025-05-29
Payer: MEDICARE

## 2025-05-29 DIAGNOSIS — I26.99 PULMONARY EMBOLISM, UNSPECIFIED CHRONICITY, UNSPECIFIED PULMONARY EMBOLISM TYPE, UNSPECIFIED WHETHER ACUTE COR PULMONALE PRESENT (MULTI): ICD-10-CM

## 2025-05-29 NOTE — TELEPHONE ENCOUNTER
Pt contacted the office, he is interested in switching from Coumadin to Eliquis. This was discussed at the last Dr Ramicone OV. Pt had mentioned that he has as current supply of Eliquis at home.     Per Dr Ramicone, start Eliquis after being off Coumadin for 2 full days.    Called pt and left a detailed VM. I requested a call back.

## 2025-05-30 ENCOUNTER — ANTICOAGULATION - WARFARIN VISIT (OUTPATIENT)
Dept: CARDIOLOGY | Facility: CLINIC | Age: 70
End: 2025-05-30
Payer: MEDICARE

## 2025-05-30 ENCOUNTER — TELEPHONE (OUTPATIENT)
Dept: CARDIOLOGY | Facility: CLINIC | Age: 70
End: 2025-05-30
Payer: MEDICARE

## 2025-05-30 ENCOUNTER — APPOINTMENT (OUTPATIENT)
Dept: CARDIOLOGY | Facility: CLINIC | Age: 70
End: 2025-05-30
Payer: MEDICARE

## 2025-05-30 DIAGNOSIS — I26.99 PULMONARY EMBOLISM, UNSPECIFIED CHRONICITY, UNSPECIFIED PULMONARY EMBOLISM TYPE, UNSPECIFIED WHETHER ACUTE COR PULMONALE PRESENT (MULTI): Primary | ICD-10-CM

## 2025-05-30 DIAGNOSIS — Z79.01 LONG TERM (CURRENT) USE OF ANTICOAGULANTS: ICD-10-CM

## 2025-05-30 NOTE — TELEPHONE ENCOUNTER
Pt returned call (left a ). He is taking last dose of coumadin tonight and will start eliquis 5 mg bid on Monday. He stated that he does not need Eliquis filled at this time, he has approx 2.5-3mos at home. He will let the office know when it needs to be sent to [facility]. He will call to cancel INR check that is scheduled for Tuesday.     Notified pt that I will update med list.

## 2025-05-30 NOTE — TELEPHONE ENCOUNTER
1328hrs:  Patient called and left a voicemail message canceling POCT appointment scheduled for next Tuesday, Jyoti 3, 2025.  Patient reported in message that he is no longer taking warfarin and has transitioned to ELIQUIS therapy.  This has been confirmed by telephone note from Natacha Messer RN, dated May 29, 2025.  Patient discharged from the Coumadin Clinic due to discontinuation of warfarin therapy.

## 2025-06-03 ENCOUNTER — APPOINTMENT (OUTPATIENT)
Dept: CARDIOLOGY | Facility: CLINIC | Age: 70
End: 2025-06-03
Payer: MEDICARE

## 2025-07-02 ENCOUNTER — HOSPITAL ENCOUNTER (OUTPATIENT)
Dept: CARDIOLOGY | Facility: CLINIC | Age: 70
Discharge: HOME | End: 2025-07-02
Payer: MEDICARE

## 2025-07-02 DIAGNOSIS — I44.1 AV BLOCK, MOBITZ II: ICD-10-CM

## 2025-07-02 DIAGNOSIS — Z95.0 PRESENCE OF CARDIAC PACEMAKER: ICD-10-CM

## 2025-07-02 PROCEDURE — 93296 REM INTERROG EVL PM/IDS: CPT

## 2025-07-02 PROCEDURE — 93294 REM INTERROG EVL PM/LDLS PM: CPT | Performed by: INTERNAL MEDICINE

## 2025-08-13 DIAGNOSIS — M54.17 LUMBOSACRAL RADICULITIS: ICD-10-CM

## 2025-08-14 ENCOUNTER — TELEPHONE (OUTPATIENT)
Dept: CARDIOLOGY | Facility: CLINIC | Age: 70
End: 2025-08-14
Payer: MEDICARE

## 2025-08-15 RX ORDER — GABAPENTIN 300 MG/1
300 CAPSULE ORAL
Qty: 200 CAPSULE | Refills: 2 | Status: SHIPPED | OUTPATIENT
Start: 2025-08-15

## 2025-09-03 ENCOUNTER — APPOINTMENT (OUTPATIENT)
Dept: PRIMARY CARE | Facility: CLINIC | Age: 70
End: 2025-09-03
Payer: MEDICARE

## 2025-09-08 ENCOUNTER — APPOINTMENT (OUTPATIENT)
Dept: PRIMARY CARE | Facility: CLINIC | Age: 70
End: 2025-09-08
Payer: MEDICARE

## 2025-10-21 ENCOUNTER — APPOINTMENT (OUTPATIENT)
Dept: PRIMARY CARE | Facility: CLINIC | Age: 70
End: 2025-10-21
Payer: MEDICARE

## (undated) DEVICE — WOUND SYSTEM, DEBRIDEMENT & CLEANING, O.R DUOPAK

## (undated) DEVICE — Device

## (undated) DEVICE — INTRODUCER SYSTEM, PRELUDE SNAP, SPLITTABLE, HEMOSTATIC, 7FR

## (undated) DEVICE — CABLE, SURGICAL, SM CLIP

## (undated) DEVICE — DRESSING, MEPILEX BORDER, POST-OP AG, 4 X 6 IN

## (undated) DEVICE — ENVELOPE, ANTIBACTERIAL, AIGIS RX TYRX, ABSORBABLE, LRG

## (undated) DEVICE — ACCESS KIT, MINI MAK, 4FR X 10CML, 0.018 X 40CM, SS/SS, ECHO ENHANCED 7CM NDL